# Patient Record
Sex: MALE | Race: WHITE | Employment: FULL TIME | URBAN - METROPOLITAN AREA
[De-identification: names, ages, dates, MRNs, and addresses within clinical notes are randomized per-mention and may not be internally consistent; named-entity substitution may affect disease eponyms.]

---

## 2017-10-16 ENCOUNTER — OFFICE VISIT (OUTPATIENT)
Dept: FAMILY MEDICINE | Facility: CLINIC | Age: 32
End: 2017-10-16
Payer: COMMERCIAL

## 2017-10-16 VITALS
DIASTOLIC BLOOD PRESSURE: 62 MMHG | WEIGHT: 150 LBS | SYSTOLIC BLOOD PRESSURE: 136 MMHG | HEIGHT: 69 IN | BODY MASS INDEX: 22.22 KG/M2 | TEMPERATURE: 99.4 F | HEART RATE: 62 BPM | RESPIRATION RATE: 14 BRPM | OXYGEN SATURATION: 97 %

## 2017-10-16 DIAGNOSIS — Z00.00 ROUTINE GENERAL MEDICAL EXAMINATION AT A HEALTH CARE FACILITY: Primary | ICD-10-CM

## 2017-10-16 PROCEDURE — 80053 COMPREHEN METABOLIC PANEL: CPT | Performed by: FAMILY MEDICINE

## 2017-10-16 PROCEDURE — 84443 ASSAY THYROID STIM HORMONE: CPT | Performed by: FAMILY MEDICINE

## 2017-10-16 PROCEDURE — 90715 TDAP VACCINE 7 YRS/> IM: CPT | Performed by: FAMILY MEDICINE

## 2017-10-16 PROCEDURE — 99395 PREV VISIT EST AGE 18-39: CPT | Mod: 25 | Performed by: FAMILY MEDICINE

## 2017-10-16 PROCEDURE — 80061 LIPID PANEL: CPT | Performed by: FAMILY MEDICINE

## 2017-10-16 PROCEDURE — 36415 COLL VENOUS BLD VENIPUNCTURE: CPT | Performed by: FAMILY MEDICINE

## 2017-10-16 PROCEDURE — 90471 IMMUNIZATION ADMIN: CPT | Performed by: FAMILY MEDICINE

## 2017-10-16 NOTE — LETTER
October 17, 2017      Sampson Sal  [address]   [facility] [address]        Dear ,    We are writing to inform you of your test results.    Your test results fall within the expected range(s) or remain unchanged from previous results.  Please continue with current treatment plan.    Resulted Orders   Comprehensive metabolic panel   Result Value Ref Range    Sodium 137 133 - 144 mmol/L    Potassium 3.8 3.4 - 5.3 mmol/L    Chloride 101 94 - 109 mmol/L    Carbon Dioxide 24 20 - 32 mmol/L    Anion Gap 12 3 - 14 mmol/L    Glucose 79 70 - 99 mg/dL      Comment:      Non Fasting    Urea Nitrogen 13 7 - 30 mg/dL    Creatinine 0.84 0.66 - 1.25 mg/dL    GFR Estimate >90 >60 mL/min/1.7m2      Comment:      Non  GFR Calc    GFR Estimate If Black >90 >60 mL/min/1.7m2      Comment:       GFR Calc    Calcium 9.5 8.5 - 10.1 mg/dL    Bilirubin Total 0.8 0.2 - 1.3 mg/dL    Albumin 4.5 3.4 - 5.0 g/dL    Protein Total 7.4 6.8 - 8.8 g/dL    Alkaline Phosphatase 66 40 - 150 U/L    ALT 21 0 - 70 U/L    AST 21 0 - 45 U/L   Lipid Profile   Result Value Ref Range    Cholesterol 151 <200 mg/dL    Triglycerides 50 <150 mg/dL      Comment:      Non Fasting    HDL Cholesterol 81 >39 mg/dL    LDL Cholesterol Calculated 60 <100 mg/dL      Comment:      Desirable:       <100 mg/dl    Non HDL Cholesterol 70 <130 mg/dL   TSH with free T4 reflex   Result Value Ref Range    TSH 1.43 0.40 - 4.00 mU/L       If you have any questions or concerns, please call the clinic at the number listed above.       Sincerely,        Markell Wick MD

## 2017-10-16 NOTE — MR AVS SNAPSHOT
After Visit Summary   10/16/2017    Sampson Sal    MRN: [id number]           Patient Information     Date Of Birth          1985        Visit Information        Provider Department      10/16/2017 2:00 PM Markell Wick MD [facility]        Today's Diagnoses     Routine general medical examination at a health care facility    -  1      Care Instructions      Preventive Health Recommendations  Male Ages 26 - 39    Yearly exam:             See your health care provider every year in order to  o   Review health changes.   o   Discuss preventive care.    o   Review your medicines if your doctor has prescribed any.    You should be tested each year for STDs (sexually transmitted diseases), if you re at risk.     After age 35, talk to your provider about cholesterol testing. If you are at risk for heart disease, have your cholesterol tested at least every 5 years.     If you are at risk for diabetes, you should have a diabetes test (fasting glucose).  Shots: Get a flu shot each year. Get a tetanus shot every 10 years.     Nutrition:    Eat at least 5 servings of fruits and vegetables daily.     Eat whole-grain bread, whole-wheat pasta and brown rice instead of white grains and rice.     Talk to your provider about Calcium and Vitamin D.     Lifestyle    Exercise for at least 150 minutes a week (30 minutes a day, 5 days a week). This will help you control your weight and prevent disease.     Limit alcohol to one drink per day.     No smoking.     Wear sunscreen to prevent skin cancer.     See your dentist every six months for an exam and cleaning.             Follow-ups after your visit        Who to contact     If you have questions or need follow up information about today's clinic visit or your schedule please contact [facility] directly at [phone].  Normal or non-critical lab and imaging results will be communicated to you by MyChart, letter or phone  "within 4 business days after the clinic has received the results. If you do not hear from us within 7 days, please contact the clinic through [employer] or phone. If you have a critical or abnormal lab result, we will notify you by phone as soon as possible.  Submit refill requests through [employer] or call your pharmacy and they will forward the refill request to us. Please allow 3 business days for your refill to be completed.          Additional Information About Your Visit        [employer] Information     [employer] lets you send messages to your doctor, view your test results, renew your prescriptions, schedule appointments and more. To sign up, go to [email or web address] . Click on \"Log in\" on the left side of the screen, which will take you to the Welcome page. Then click on \"Sign up Now\" on the right side of the page.     You will be asked to enter the access code listed below, as well as some personal information. Please follow the directions to create your username and password.     Your access code is: [email or web address]  Expires: 2018  3:08 PM     Your access code will  in 90 days. If you need help or a new code, please call your [place] clinic or [phone].        Care EveryWhere ID     This is your Care EveryWhere ID. This could be used by other organizations to access your [place] medical records  [id number]        Your Vitals Were     Pulse Temperature Respirations Height Pulse Oximetry BMI (Body Mass Index)    62 99.4  F (37.4  C) (Oral) 14 5' 9.2\" (1.758 m) 97% 22.02 kg/m2       Blood Pressure from Last 3 Encounters:   10/16/17 136/62   16 110/74   02/10/15 129/78    Weight from Last 3 Encounters:   10/16/17 150 lb (68 kg)   16 154 lb 14.4 oz (70.3 kg)   02/10/15 159 lb 3.2 oz (72.2 kg)              We Performed the Following     ADMIN 1st VACCINE     Comprehensive metabolic panel     Lipid Profile     SCREENING QUESTIONS FOR ADULT IMMUNIZATIONS     TDAP VACCINE (ADACEL)     TSH " with free T4 reflex        Primary Care Provider Office Phone # Fax #    Adonis Horvath [phone]       [address] [facility] [address]  [facility] [address]        Equal Access to Services     MICHELLE LAGUNA : Hadii aad ku hadberlino Soomaali, waaxda luqadaha, qaybta kaalmada adeegyada, waxjosr torresn curly garcia laZulycourtney bruner. So [facility] [phone].    ATENCIÓN: Si habla español, tiene a kirkpatrick disposición servicios gratuitos de asistencia lingüística. Llame al [phone].    We comply with applicable federal civil rights laws and [place] laws. We do not discriminate on the basis of race, color, national origin, age, disability, sex, sexual orientation, or gender identity.            Thank you!     Thank you for choosing [facility]  for your care. Our goal is always to provide you with excellent care. Hearing back from our patients is one way we can continue to improve our services. Please take a few minutes to complete the written survey that you may receive in the mail after your visit with us. Thank you!             Your Updated Medication List - Protect others around you: Learn how to safely use, store and throw away your medicines at www.disposemymeds.org.          This list is accurate as of: 10/16/17  3:08 PM.  Always use your most recent med list.                   Brand Name Dispense Instructions for use Diagnosis    DIFFERIN 0.1 % gel   Generic drug:  adapalene      Apply 0.5 inches topically daily (with breakfast). Apply to face        DUAC 1.2-5 % Gel   Generic drug:  clindamycin-benzoyl Per (Refr)      Externally apply  topically.

## 2017-10-16 NOTE — NURSING NOTE
"Chief Complaint   Patient presents with     Physical       Initial /62 (BP Location: Left arm)  Pulse 62  Temp 99.4  F (37.4  C) (Oral)  Resp 14  Ht 5' 9.2\" (1.758 m)  Wt 150 lb (68 kg)  SpO2 97%  BMI 22.02 kg/m2 Estimated body mass index is 22.02 kg/(m^2) as calculated from the following:    Height as of this encounter: 5' 9.2\" (1.758 m).    Weight as of this encounter: 150 lb (68 kg).  Medication Reconciliation: complete     Jocelyne Sears CMA      "

## 2017-10-16 NOTE — PROGRESS NOTES
SUBJECTIVE:   CC: Sampson Sal is an 32 year old male who presents for preventative health visit.     Healthy Habits:    Do you get at least three servings of calcium containing foods daily (dairy, green leafy vegetables, etc.)? yes    Amount of exercise or daily activities, outside of work: 3 day(s) per week    Problems taking medications regularly No    Medication side effects: No    Have you had an eye exam in the past two years? yes    Do you see a dentist twice per year? yes    Do you have sleep apnea, excessive snoring or daytime drowsiness?no            Occasional IBS symptoms , usually with alcohol or caffeine use, trying to cut back  Today's PHQ-2 Score:   PHQ-2 ( 1999 [employer]) 10/16/2017 6/24/2016   Q1: Little interest or pleasure in doing things 0 0   Q2: Feeling down, depressed or hopeless 0 0   PHQ-2 Score 0 0         Abuse: Current or Past(Physical, Sexual or Emotional)- No  Do you feel safe in your environment - Yes  Social History   Substance Use Topics     Smoking status: Never Smoker     Smokeless tobacco: Not on file     Alcohol use Yes     The patient does not drink >3 drinks per day nor >7 drinks per week.    Last PSA: No results found for: PSA    Reviewed orders with patient. Reviewed health maintenance and updated orders accordingly - Yes  Labs reviewed in EPIC    Reviewed and updated as needed this visit by clinical staff  Tobacco  Allergies  Meds         Reviewed and updated as needed this visit by Provider        Past Medical History:   Diagnosis Date     NO ACTIVE PROBLEMS         ROS:  C: NEGATIVE for fever, chills, change in weight  I: NEGATIVE for worrisome rashes, moles or lesions  E: NEGATIVE for vision changes or irritation  ENT: NEGATIVE for ear, mouth and throat problems  R: NEGATIVE for significant cough or SOB  CV: NEGATIVE for chest pain, palpitations or peripheral edema  GI: NEGATIVE for nausea, abdominal pain, heartburn, or change in bowel habits   male: negative for  "dysuria, hematuria, decreased urinary stream, erectile dysfunction, urethral discharge  M: NEGATIVE for significant arthralgias or myalgia  N: NEGATIVE for weakness, dizziness or paresthesias  P: NEGATIVE for changes in mood or affect    OBJECTIVE:   /62 (BP Location: Left arm)  Pulse 62  Temp 99.4  F (37.4  C) (Oral)  Resp 14  Ht 5' 9.2\" (1.758 m)  Wt 150 lb (68 kg)  SpO2 97%  BMI 22.02 kg/m2  EXAM:  GENERAL: healthy, alert and no distress  EYES: Eyes grossly normal to inspection, PERRL and conjunctivae and sclerae normal  HENT: ear canals and TM's normal, nose and mouth without ulcers or lesions  NECK: no adenopathy, no asymmetry, masses, or scars and thyroid normal to palpation  RESP: lungs clear to auscultation - no rales, rhonchi or wheezes  CV: regular rate and rhythm, normal S1 S2, no S3 or S4, no murmur, click or rub, no peripheral edema and peripheral pulses strong  ABDOMEN: soft, nontender, no hepatosplenomegaly, no masses and bowel sounds normal   (male): normal male genitalia without lesions or urethral discharge, no hernia  MS: no gross musculoskeletal defects noted, no edema  SKIN: no suspicious lesions or rashes  NEURO: Normal strength and tone, mentation intact and speech normal  PSYCH: mentation appears normal, affect normal/bright    ASSESSMENT/PLAN:   1. Routine general medical examination at a health care facility  In [facility]  - TDAP VACCINE (ADACEL)  - ADMIN 1st VACCINE  - Comprehensive metabolic panel  - Lipid Profile  - TSH with free T4 reflex    COUNSELING:  Reviewed preventive health counseling, as reflected in patient instructions       Regular exercise       Healthy diet/nutrition       Vision screening       Hearing screening       Immunizations    Vaccinated for: TDAP           Alcohol Use       Safe sex practices/STD prevention             reports that he has never smoked. He does not have any smokeless tobacco history on file.      Estimated body mass index is 22.02 " "kg/(m^2) as calculated from the following:    Height as of this encounter: 5' 9.2\" (1.758 m).    Weight as of this encounter: 150 lb (68 kg).         Counseling Resources:  ATP IV Guidelines  Pooled Cohorts Equation Calculator  FRAX Risk Assessment  ICSI Preventive Guidelines  Dietary Guidelines for Americans, 2010  USDA's MyPlate  ASA Prophylaxis  Lung CA Screening    Markell Wick MD  [facility]  "

## 2017-10-17 LAB
ALBUMIN SERPL-MCNC: 4.5 G/DL (ref 3.4–5)
ALP SERPL-CCNC: 66 U/L (ref 40–150)
ALT SERPL W P-5'-P-CCNC: 21 U/L (ref 0–70)
ANION GAP SERPL CALCULATED.3IONS-SCNC: 12 MMOL/L (ref 3–14)
AST SERPL W P-5'-P-CCNC: 21 U/L (ref 0–45)
BILIRUB SERPL-MCNC: 0.8 MG/DL (ref 0.2–1.3)
BUN SERPL-MCNC: 13 MG/DL (ref 7–30)
CALCIUM SERPL-MCNC: 9.5 MG/DL (ref 8.5–10.1)
CHLORIDE SERPL-SCNC: 101 MMOL/L (ref 94–109)
CHOLEST SERPL-MCNC: 151 MG/DL
CO2 SERPL-SCNC: 24 MMOL/L (ref 20–32)
CREAT SERPL-MCNC: 0.84 MG/DL (ref 0.66–1.25)
GFR SERPL CREATININE-BSD FRML MDRD: >90 ML/MIN/1.7M2
GLUCOSE SERPL-MCNC: 79 MG/DL (ref 70–99)
HDLC SERPL-MCNC: 81 MG/DL
LDLC SERPL CALC-MCNC: 60 MG/DL
NONHDLC SERPL-MCNC: 70 MG/DL
POTASSIUM SERPL-SCNC: 3.8 MMOL/L (ref 3.4–5.3)
PROT SERPL-MCNC: 7.4 G/DL (ref 6.8–8.8)
SODIUM SERPL-SCNC: 137 MMOL/L (ref 133–144)
TRIGL SERPL-MCNC: 50 MG/DL
TSH SERPL DL<=0.005 MIU/L-ACNC: 1.43 MU/L (ref 0.4–4)

## 2018-05-08 ENCOUNTER — TRANSFERRED RECORDS (OUTPATIENT)
Dept: HEALTH INFORMATION MANAGEMENT | Facility: CLINIC | Age: 33
End: 2018-05-08

## 2018-07-09 ENCOUNTER — TRANSFERRED RECORDS (OUTPATIENT)
Dept: HEALTH INFORMATION MANAGEMENT | Facility: CLINIC | Age: 33
End: 2018-07-09

## 2018-08-20 ENCOUNTER — TRANSFERRED RECORDS (OUTPATIENT)
Dept: HEALTH INFORMATION MANAGEMENT | Facility: CLINIC | Age: 33
End: 2018-08-20

## 2019-08-19 ENCOUNTER — OFFICE VISIT (OUTPATIENT)
Dept: FAMILY MEDICINE | Facility: CLINIC | Age: 34
End: 2019-08-19
Payer: COMMERCIAL

## 2019-08-19 ENCOUNTER — NURSE TRIAGE (OUTPATIENT)
Dept: NURSING | Facility: CLINIC | Age: 34
End: 2019-08-19

## 2019-08-19 VITALS
HEART RATE: 67 BPM | DIASTOLIC BLOOD PRESSURE: 74 MMHG | OXYGEN SATURATION: 97 % | HEIGHT: 70 IN | WEIGHT: 146.2 LBS | BODY MASS INDEX: 20.93 KG/M2 | SYSTOLIC BLOOD PRESSURE: 116 MMHG | TEMPERATURE: 98.1 F

## 2019-08-19 DIAGNOSIS — F33.9 EPISODE OF RECURRENT MAJOR DEPRESSIVE DISORDER, UNSPECIFIED DEPRESSION EPISODE SEVERITY (H): ICD-10-CM

## 2019-08-19 DIAGNOSIS — G47.00 INSOMNIA, UNSPECIFIED TYPE: ICD-10-CM

## 2019-08-19 DIAGNOSIS — F41.1 GAD (GENERALIZED ANXIETY DISORDER): ICD-10-CM

## 2019-08-19 DIAGNOSIS — F52.0 LACK OF LIBIDO: Primary | ICD-10-CM

## 2019-08-19 DIAGNOSIS — R53.83 FATIGUE, UNSPECIFIED TYPE: ICD-10-CM

## 2019-08-19 LAB
BASOPHILS # BLD AUTO: 0.1 10E9/L (ref 0–0.2)
BASOPHILS NFR BLD AUTO: 1.5 %
DIFFERENTIAL METHOD BLD: NORMAL
EOSINOPHIL # BLD AUTO: 0.1 10E9/L (ref 0–0.7)
EOSINOPHIL NFR BLD AUTO: 2.3 %
ERYTHROCYTE [DISTWIDTH] IN BLOOD BY AUTOMATED COUNT: 13.6 % (ref 10–15)
HCT VFR BLD AUTO: 40.8 % (ref 40–53)
HGB BLD-MCNC: 14.1 G/DL (ref 13.3–17.7)
LYMPHOCYTES # BLD AUTO: 1.6 10E9/L (ref 0.8–5.3)
LYMPHOCYTES NFR BLD AUTO: 29.7 %
MCH RBC QN AUTO: 30.9 PG (ref 26.5–33)
MCHC RBC AUTO-ENTMCNC: 34.6 G/DL (ref 31.5–36.5)
MCV RBC AUTO: 89 FL (ref 78–100)
MONOCYTES # BLD AUTO: 0.7 10E9/L (ref 0–1.3)
MONOCYTES NFR BLD AUTO: 12.5 %
NEUTROPHILS # BLD AUTO: 2.9 10E9/L (ref 1.6–8.3)
NEUTROPHILS NFR BLD AUTO: 54 %
PLATELET # BLD AUTO: 269 10E9/L (ref 150–450)
RBC # BLD AUTO: 4.57 10E12/L (ref 4.4–5.9)
WBC # BLD AUTO: 5.3 10E9/L (ref 4–11)

## 2019-08-19 PROCEDURE — 85025 COMPLETE CBC W/AUTO DIFF WBC: CPT | Performed by: NURSE PRACTITIONER

## 2019-08-19 PROCEDURE — 84443 ASSAY THYROID STIM HORMONE: CPT | Performed by: NURSE PRACTITIONER

## 2019-08-19 PROCEDURE — 80053 COMPREHEN METABOLIC PANEL: CPT | Performed by: NURSE PRACTITIONER

## 2019-08-19 PROCEDURE — 99214 OFFICE O/P EST MOD 30 MIN: CPT | Performed by: NURSE PRACTITIONER

## 2019-08-19 PROCEDURE — 36415 COLL VENOUS BLD VENIPUNCTURE: CPT | Performed by: NURSE PRACTITIONER

## 2019-08-19 RX ORDER — HYDROXYZINE PAMOATE 25 MG/1
25 CAPSULE ORAL 3 TIMES DAILY PRN
Qty: 90 CAPSULE | Refills: 1 | Status: SHIPPED | OUTPATIENT
Start: 2019-08-19 | End: 2021-04-01

## 2019-08-19 ASSESSMENT — ANXIETY QUESTIONNAIRES
7. FEELING AFRAID AS IF SOMETHING AWFUL MIGHT HAPPEN: SEVERAL DAYS
6. BECOMING EASILY ANNOYED OR IRRITABLE: SEVERAL DAYS
3. WORRYING TOO MUCH ABOUT DIFFERENT THINGS: MORE THAN HALF THE DAYS
1. FEELING NERVOUS, ANXIOUS, OR ON EDGE: MORE THAN HALF THE DAYS
2. NOT BEING ABLE TO STOP OR CONTROL WORRYING: MORE THAN HALF THE DAYS
GAD7 TOTAL SCORE: 12
IF YOU CHECKED OFF ANY PROBLEMS ON THIS QUESTIONNAIRE, HOW DIFFICULT HAVE THESE PROBLEMS MADE IT FOR YOU TO DO YOUR WORK, TAKE CARE OF THINGS AT HOME, OR GET ALONG WITH OTHER PEOPLE: SOMEWHAT DIFFICULT
5. BEING SO RESTLESS THAT IT IS HARD TO SIT STILL: MORE THAN HALF THE DAYS

## 2019-08-19 ASSESSMENT — PATIENT HEALTH QUESTIONNAIRE - PHQ9
SUM OF ALL RESPONSES TO PHQ QUESTIONS 1-9: 13
5. POOR APPETITE OR OVEREATING: MORE THAN HALF THE DAYS

## 2019-08-19 ASSESSMENT — MIFFLIN-ST. JEOR: SCORE: 1605.66

## 2019-08-19 NOTE — TELEPHONE ENCOUNTER
"Symptoms starting with \"low libido\"  type symptom that he attributed to stress. Reporting during \"last few days unusual symptoms.\" Decreased appetite, feeling jittery. \"I feel like I have anxiety.\" Difficulty sleeping \"I get 3 hours of sleep.\" Reporting difficulty sleeping in past few days.  \"I am in a weird stressful part of my life.\" Patient reporting spouse is pregnant. Stating his job is stressful. Patient is requesting to be seen today. Nausea.     Caller verbalized understanding. Denies further questions.    Meena Young RN  [facility]         Reason for Disposition    [1] Symptoms of anxiety or panic AND [2] has not been evaluated for this by physician    Additional Information    Negative: Severe difficulty breathing (e.g., struggling for each breath, speaks in single words)    Negative: Bluish (or gray) lips or face now    Negative: Difficult to awaken or acting confused (e.g., disoriented, slurred speech)    Negative: Hysterical or combative behavior    Negative: Sounds like a life-threatening emergency to the triager    Negative: [1] Difficulty breathing AND [2] persists > 10 minutes AND [3] not relieved by reassurance provided by triager    Negative: [1] Lightheadedness or dizziness AND [2] persists > 10 minutes AND [3] not relieved by reassurance provided by triager    Negative: [1] Known or suspected alcohol or drug abuse AND [2] feeling very shaky (i.e., visible tremors of hands)    Negative: Patient sounds very sick or weak to the triager    Negative: Symptoms interfere with work or school    Negative: Requesting to talk to a counselor (e.g., mental health worker, psychiatrist)    Negative: Patient sounds very upset or troubled to the triager    Protocols used: ANXIETY AND PANIC ATTACK-A-AH      "

## 2019-08-19 NOTE — PROGRESS NOTES
"Subjective     Sampson Sal is a 33 year old male who presents to clinic today for the following health issues:    HPI   Mood changes      Duration: a couple weeks     Description (location/character/radiation): a lot of anxiety, some small panic attacks, low sex drive, has not slept well the past few nights, has not been hungry, a little fatigue and tired.     Intensity:  moderate, severe    Accompanying signs and symptoms: tired, all the above.     History (similar episodes/previous evaluation): None    Precipitating or alleviating factors: None    Therapies tried and outcome: None     Thinks has a hormonal imbalance   One of the symptoms is complete lack libido, not taking care of himself  Reports for the past year has been stressful and made bad decisions, drank more  Started taking a Herbal remedy - Kratom on and off for the past 1-2 years -opioid type prperties he took approx 2-3 evenings a week felt relaxed, thought was a better alternative to alcohol  embarassed and has a hard time admitting this, very hesitant historian  Job \"weird,\" works in a lab , different management etc  Wife is expecting due in Oct first child  Thinking about the future and state of the world freaks him out, stories on news cause panic  Wife is a good support talked to her about it for the first time, he doesn't want to burden her   Stopped the herbal 3 weeks ago  Never counseling   Admits feels panicked when I said might be a wait to get in to see Endocrinology     Thinks he might have a lump in his penis that might be new, adds at end of visit, does not want exam today even though he knows he should   Last health maintenance exam 2017      Patient Active Problem List   Diagnosis     CARDIOVASCULAR SCREENING; LDL GOAL LESS THAN 160     ANDRIA (generalized anxiety disorder)     Lack of libido     Episode of recurrent major depressive disorder, unspecified depression episode severity (H)     Past Surgical History:   Procedure Laterality " "Date     none         Social History     Tobacco Use     Smoking status: Never Smoker     Smokeless tobacco: Never Used   Substance Use Topics     Alcohol use: Yes     History reviewed. No pertinent family history.      Current Outpatient Medications   Medication Sig Dispense Refill     hydrOXYzine (VISTARIL) 25 MG capsule Take 1 capsule (25 mg) by mouth 3 times daily as needed for anxiety And 2-4 capsules ( mg) at night for sleep 90 capsule 1     adapalene (DIFFERIN) 0.1 % gel Apply 0.5 inches topically daily (with breakfast). Apply to face       Clindamycin-Benzoyl Per, Refr, (DUAC) 1-5 % GEL Externally apply  topically.       No Known Allergies  Recent Labs   Lab Test 10/16/17  1425 06/24/16  1617 02/10/15  1521   LDL 60  --   --    HDL 81  --   --    TRIG 50  --   --    ALT 21 20 20   CR 0.84 0.86 0.78   GFRESTIMATED >90 >90  Non  GFR Calc   >90  Non  GFR Calc     GFRESTBLACK >90 >90   GFR Calc   >90   GFR Calc     POTASSIUM 3.8 4.2 3.9   TSH 1.43 1.59  --       BP Readings from Last 3 Encounters:   08/19/19 116/74   10/16/17 136/62   06/24/16 110/74    Wt Readings from Last 3 Encounters:   08/19/19 66.3 kg (146 lb 3.2 oz)   10/16/17 68 kg (150 lb)   06/24/16 70.3 kg (154 lb 14.4 oz)                      Reviewed and updated as needed this visit by Provider         Review of Systems   ROS COMP: Constitutional, HEENT, cardiovascular, pulmonary, GI, , musculoskeletal, neuro, skin, endocrine and psych systems are negative, except as otherwise noted.      Objective    /74   Pulse 67   Temp 98.1  F (36.7  C) (Oral)   Ht 1.78 m (5' 10.08\")   Wt 66.3 kg (146 lb 3.2 oz)   SpO2 97%   BMI 20.93 kg/m    Body mass index is 20.93 kg/m .  Physical Exam   GENERAL: healthy, thin, alert and no distress  EYES: Eyes grossly normal to inspection, PERRL and conjunctivae and sclerae normal  RESP: Respiratory rate regular and breathing easily   CV: No " abnormal color and extremities warm   MS: no gross musculoskeletal defects noted, no edema  SKIN: no suspicious lesions or rashes  NEURO: Normal strength and tone, mentation intact and speech normal  PSYCH: MENTAL STATUS EXAM  Appearance: appropriate  Attitude: guarded  Behavior: stiff appearing, quiet and then once talking many questions  Eye Contact: 90%  Speech: normal  Orientation: oriented to person , place, time and situation  Mood: states his mood has been low, anxious   Affect: flat and very anxious vs angry frustrated, difficult time answering at times   Thought Process: difficult to assess, insight lacking seems clear for the most part     Diagnostic Test Results:  Labs reviewed in Epic  Results for orders placed or performed in visit on 08/19/19 (from the past 24 hour(s))   CBC with platelets differential   Result Value Ref Range    WBC 5.3 4.0 - 11.0 10e9/L    RBC Count 4.57 4.4 - 5.9 10e12/L    Hemoglobin 14.1 13.3 - 17.7 g/dL    Hematocrit 40.8 40.0 - 53.0 %    MCV 89 78 - 100 fl    MCH 30.9 26.5 - 33.0 pg    MCHC 34.6 31.5 - 36.5 g/dL    RDW 13.6 10.0 - 15.0 %    Platelet Count 269 150 - 450 10e9/L    % Neutrophils 54.0 %    % Lymphocytes 29.7 %    % Monocytes 12.5 %    % Eosinophils 2.3 %    % Basophils 1.5 %    Absolute Neutrophil 2.9 1.6 - 8.3 10e9/L    Absolute Lymphocytes 1.6 0.8 - 5.3 10e9/L    Absolute Monocytes 0.7 0.0 - 1.3 10e9/L    Absolute Eosinophils 0.1 0.0 - 0.7 10e9/L    Absolute Basophils 0.1 0.0 - 0.2 10e9/L    Diff Method Automated Method            Assessment & Plan       ICD-10-CM    1. Lack of libido F52.0 ENDOCRINOLOGY ADULT REFERRAL   2. ANDRIA (generalized anxiety disorder) F41.1 Comprehensive metabolic panel     TSH with free T4 reflex     CBC with platelets differential     MENTAL HEALTH REFERRAL  - Adult; Outpatient Treatment; Individual/Couples/Family/Group Therapy/Health Psychology; G: [facility] [phone]; We will contact you to schedule the  appointment or please call with any questions     hydrOXYzine (VISTARIL) 25 MG capsule   3. Episode of recurrent major depressive disorder, unspecified depression episode severity (H) F33.9 Comprehensive metabolic panel     TSH with free T4 reflex     CBC with platelets differential     MENTAL HEALTH REFERRAL  - Adult; Outpatient Treatment; Individual/Couples/Family/Group Therapy/Health Psychology; G: [facility] [phone]; We will contact you to schedule the appointment or please call with any questions   4. Insomnia, unspecified type G47.00 hydrOXYzine (VISTARIL) 25 MG capsule   5. Fatigue, unspecified type R53.83    pt here with severe anxiety I believe is worse than he first thought, he agrees to trial vistaril for sleep and anxiety, doesn't want to start anything daily. Strongly recommended counseling, he will consider, referred. Denies any SI. Next visit will revisit ssri I would recommend, pt hesitant to do medications, agreed for hydroxyzine  Gave info on insomnia, all above conditions I feel are related to his lack of libido. Pt insists he needs to see specialist for T testing etc., referred to have a consult visit to decide whether or not testing is warranted.   Fatigue and and pt reports eating poorly and minimal exercise likely contributory along with insomnia not controlled, anxiety as above. Pt requests testing, Will do lab work today to rule out anemia, thyroid issues, electrolyte imbalances and will follow up as indicated.      Discussed the pathophysiology of anxiety episodes and the various symptoms seen associated with anxiety episodes.  Discussed possible triggers including fatigue, depression, stress, and chemicals such as alcohol, caffeine and certain drugs.  Discussed the treatment including an aerobic exercise program, adequate rest, and both rescue meds and maintenance meds.      Pt difficult historian due to state he was in, needed to help him with deep breathing to  work through panic, pt with difficult time giving feedback or answering questions if he agreed with plan. He did seem to agree with plan in patient instructions     Patient Instructions       Patient Education   Thanks for coming in, your top priority is working on your anxiety that does seem quite severe today. The first step is coming in to discuss it, so you are on the right path. Please schedule the soonest available appointment with [facility] so they can do further assessment and therapies that is the most helpful for sleep problems and adjustment to stressors as well.   Take hydroxyzine (low dose for anxiety and higher dose for sleep). Return for your health maintenance exam and we'll do the physical at that time.   Schedule with Endocrinology to get more info about your libido, although it would be good to try treating anxiety and depression since this is the most common cause more than something physically wrong with you.   Anxiety Reaction  Anxiety is the feeling we all get when we think something bad might happen. It is a normal response to stress and usually causes only a mild reaction. When anxiety becomes more severe, it can interfere with daily life. In some cases, you may not even be aware of what it is you re anxious about. There may also be a genetic link or it may be a learned behavior in the home.  Both psychological and physical triggers cause stress reaction. It's often a response to fear or emotional stress, real or imagined. This stress may come from home, family, work, or social relationships.  During an anxiety reaction, you may feel:    Helpless    Nervous    Depressed    Irritable  Your body may show signs of anxiety in many ways. You may experience:    Dry mouth    Shakiness    Dizziness    Weakness    Trouble breathing    Breathing fast (hyperventilating)    Chest pressure    Sweating    Headache    Nausea    Diarrhea    Tiredness    Inability to sleep    Sexual  problems  Home care    Try to locate the sources of stress in your life. They may not be obvious. These may include:  ? Daily hassles of life (such as traffic jams, missed appointments, or car troubles)  ? Major life changes, both good (new baby or job promotion) and bad (loss of job or loss of loved one)  ? Overload: feeling that you have too many responsibilities and can't take care of all of them at once  ? Feeling helpless or feeling that your problems are beyond what you re able to solve    Notice how your body reacts to stress. Learn to listen to your body signals. This will help you take action before the stress becomes severe.    When you can, do something about the source of your stress. (Avoid hassles, limit the amount of change that happens in your life at one time and take a break when you feel overloaded).    Unfortunately, many stressful situations can't be avoided. It is necessary to learn how to better manage stress. There are many proven methods that will reduce your anxiety. These include simple things like exercise, good nutrition, and adequate rest. Also, there are certain techniques that are helpful:  ? Relaxation  ? Breathing exercises  ? Visualization  ? Biofeedback  ? Meditation  For more information about this, consult your healthcare provider or go to a local bookstore and review the many books and tapes available on this subject.  Follow-up care  If you feel that your anxiety is not responding to self-help measures, contact your healthcare provider or make an appointment with a counselor. You may need short-term psychological counseling and temporary medicine to help you manage stress.  Call 911  Call 911 if any of these happen:    Trouble breathing    Confusion    Drowsiness or trouble wakening    Fainting or loss of consciousness    Rapid heart rate    Seizure    New chest pain that becomes more severe, lasts longer, or spreads into your shoulder, arm, neck, jaw, or back  When to seek  medical advice  Call your healthcare provider right away if any of these happen:    Your symptoms get worse    Severe headache not relieved by rest and mild pain reliever  Date Last Reviewed: 10/1/2017    7993-7542 [employer]. [address] [place] [address], [place] [address]. All rights reserved. This information is not intended as a substitute for professional medical care. Always follow your healthcare professional's instructions.           Patient Education     Insomnia  Insomnia is repeated difficulty going to sleep or staying asleep, or both. Whether you have insomnia is not defined by a specific amount of sleep. Different people need different amounts of sleep, and you may need more or less sleep at different times of your life.  There are 3 major types of insomnia: short-term, chronic, and  other.  Short-term, or acute insomnia lasts less than 3 months. The symptoms are temporary and can be linked directly to a stressor, such as the death of a loved one, financial problems, or a new physical problem. Short-term insomnia stops when the stressor resolves or the person adapts to its presence.  Chronic insomnia occurs at least 3 times a week and lasts longer than 3 months. Chronic insomnia can occur when either the cause of the sleeping problem is not clear, or the insomnia does not get better when the stressor is resolved. A number of other criteria are also used to make the diagnosis of chronic insomnia.    Other insomnia  is the third type of insomnia-related sleep disorders. This description applies to people who have problems getting to sleep or staying asleep, but don't meet all of the factors that describe either short-term or chronic insomnia.    Many things cause insomnia. Different people may have different causes. It can be from an underlying medical or psychological condition, or lifestyle. It can also be primary insomnia, which means no cause can be found.  Causes of insomnia  include:    Chronic medical problems- heart disease, gastrointestinal problems, hormonal changes, breathing problems    Anxiety    Stress    Depression    Pain    Work schedule    Sleep apnea    Illegal drugs    Certain medicines  Many different medicines can affect your sleep, such as stimulants, caffeine, alcohol, some decongestants, and diet pills. Other medicines may include some types of blood pressure pills, steroids, asthma medicines, antihistamines, antidepressants, seizure medicines and statins. Not all of these will affect your sleep, and they shouldn t be stopped without talking to your doctor.  Symptoms of insomnia can include:    Lying awake for long periods at night before falling asleep    Waking up several times during the night    Waking up early in the morning and not being able to get back to sleep    Feeling tired and not refreshed by sleep    Not being able to function properly during the day and finding it hard to concentrate    Irritability    Tiredness and fatigue during the day  Home care    Review your medicines with your doctor or pharmacist to find out if they can cause insomnia. Not all medicines will affect your sleep, but they shouldn't be stopped without reviewing them with your doctor. There may be serious side effects and consequences from suddenly stopping your medicines. Not taking them may cause strokes, heart attacks, and many other problems.    Caffeine, smoking, and alcohol also affect sleep. Limit your daily use and don't use these before bedtime. Alcohol may make you sleepy at first, but as its effects wear off, you may awaken a few hours later and have trouble returning to sleep.    Don't exercise, eat or drink large amounts of liquid within 2 hours of your bedtime.    Improve your sleep habits. Have a fixed bed and wake-up time. Try to keep noise, light and heat in your bedroom at a comfortable level. Try using earplugs or eyeshades if needed.     Don't watch TV in  bed.    If you don't fall asleep within 30 minutes, try to relax by reading or listening to soft music.    Limit daytime napping to one 30 minute period, early in the day.    Get regular exercise. Find other ways to lessen your stress level.    If a medicine was prescribed to help reset your sleep patterns, take it as directed. Sleeping pills are intended for short-term use, only. If taken for too long, the effect wears off while the risk of physical addiction and psychological dependence increases.  Sleep diary  If the cause isn t obvious and it is not improving, try keeping a  sleep diary  for a couple of weeks. Include in it:    The time you go to bed    How long it takes to fall asleep    How many times you wake up    What time you wake up    Your meal times and what you eat    What time you drink alcohol and caffeine    Your exercise habits and times  Follow-up care  Follow up with your healthcare provider, or as advised. If an X-ray or CT scan was done, you will be notified if there is a change in the reading, especially if it affects treatment.  Call 911  Call 911 if any of these occur:    Trouble breathing    Confusion or trouble waking    Fainting or loss of consciousness    Rapid heart rate    New chest, arm, shoulder, neck or upper back pain    Trouble with speech or vision, weakness of an arm or leg    Trouble walking or talking, loss of balance, numbness or weakness in one side of your body, facial droop  When to seek medical advice  Call your healthcare provider right away if any of these occur:    Extreme restlessness or irritability    Confusion or hallucinations (seeing or hearing things that are not there)    Anxiety, depression    Several days without sleeping  Date Last Reviewed: 5/1/2018 2000-2018 [employer]. [address] [place] [id number]. All rights reserved. This information is not intended as a substitute for professional medical care. Always follow your  healthcare professional's instructions.           Patient Education     Treating Anxiety Disorders with Therapy    If you have an anxiety disorder, you don t have to suffer anymore. Treatment is available. Therapy (also called counseling) is often a helpful treatment for anxiety disorders. With therapy, a specially trained professional (therapist) helps you face and learn to manage your anxiety. Therapy can be short-term or long-term depending on your needs. In some cases, medicine may also be prescribed with therapy. It may take time before you notice how much therapy is helping, but stick with it. With therapy, you can feel better.  Cognitive behavioral therapy (CBT)  Cognitive behavioral therapy (CBT) teaches you to manage anxiety. It does this by helping you understand how you think and act when you re anxious. Research has shown CBT to be a very effective treatment for anxiety disorders. How CBT is run is almost like a class. It involves homework and activities to build skills that teach you to cope with anxiety step by step. It can be done in a group or one-on-one, and often takes place for a set number of sessions. CBT has two main parts:    Cognitive therapy helps you identify the negative, irrational thoughts that occur with your anxiety. You ll learn to replace these with more positive, realistic thoughts.    Behavioral therapy helps you change how you react to anxiety. You ll learn coping skills and methods for relaxing to help you better deal with anxiety.  Other forms of therapy  Other therapy methods may work better for you than CBT. Or, you may move from CBT to another form of therapy as your treatment needs change. This may mean meeting with a therapist by yourself or in a group. Therapy can also help you work through problems in your life, such as drug or alcohol dependence, that may be making your anxiety worse.  Getting better takes time  Therapy will help you feel better and teach you skills to  help manage anxiety long term. But change doesn t happen right away. It takes a commitment from you. And treatment only works if you learn to face the causes of your anxiety. So, you might feel worse before you feel better. This can sometimes make it hard to stick with it. But remember: Therapy is a very effective treatment. The results will be well worth it.  Helping yourself  If anxiety is wearing you down, here are some things you can do to cope:    Check with your doctor and rule out any physical problems that may be causing the anxiety symptoms.    If an anxiety disorder is diagnosed, seek mental healthcare. This is an illness and it can respond to treatment. Most types of anxiety disorders will respond to talk therapy and medicine.    Educate yourself about anxiety disorders. Keep track of helpful online resources and books you can use during stressful periods.    Try stress management techniques such as meditation.    Consider online or in-person support groups.    Don t fight your feelings. Anxiety feeds itself. The more you worry about it, the worse it gets. Instead, try to identify what might have triggered your anxiety. Then try to put this threat in perspective.    Keep in mind that you can t control everything about a situation. Change what you can and let the rest take its course.    Exercise -- it s a great way to relieve tension and help your body feel relaxed.    Examine your life for stress, and try to find ways to reduce it.    Avoid caffeine and nicotine, which can make anxiety symptoms worse.    Fight the temptation to turn to alcohol or unprescribed drugs for relief. They only make things worse in the long run.   Date Last Reviewed: 1/1/2017 2000-2018 [employer]. [address] [place] [address], [place] [address]. All rights reserved. This information is not intended as a substitute for professional medical care. Always follow your healthcare professional's instructions.            Patient Education     Depression  Depression is one of the most common mental health problems today. It is not just a state of unhappiness or sadness. It is a true disease. The cause seems to be related to a decrease in chemicals that transmit signals in the brain. Having a family history of depression, alcoholism, or suicide increases the risk. Chronic illness, chronic pain, migraine headaches, and high emotional stress also increase the risk.  Depression is something we tend to recognize in others, but may have a hard time seeing in ourselves. It can show in many physical and emotional ways:    Loss of appetite    Overeating    Not being able to sleep    Sleeping too much    Tiredness not related to physical exertion    Restlessness or irritability    Slowness of movement or speech    Feeling depressed or withdrawn    Loss of interest in things you once enjoyed    Trouble concentrating, poor memory, trouble making decisions    Thoughts of harming or killing oneself, or thoughts that life is not worth living    Low self-esteem  The treatment for depression may include both medicine and psychotherapy. Antidepressants can reduce suffering and can improve the ability to function during the depressed period. Therapy can offer emotional support and help you understand emotional factors that may be causing the depression.  Home care    Ongoing care and support help people manage this disease. Find a healthcare provider and therapist who meet your needs. Seek help when you feel like you may be getting ill.    Be kind to yourself. Make it a point to do things that you enjoy (gardening, walking in nature, going to a movie). Reward yourself for small successes.    Take care of your physical body. Eat a balanced diet (low in saturated fat and high in fruits and vegetables). Exercise at least 3 times a week for 30 minutes. Even mild-moderate exercise (like brisk walking) can make you feel better.    Don't drink alcohol,  which can make depression worse.    Take medicine as prescribed.    Tell each of your healthcare providers about all of the prescription and over-the-counter medicines, vitamins, and supplements you take. Certain supplements interact with medicines and can result in dangerous side effects. Ask your pharmacist when you have questions about medicine interactions.    Talk with your family and trusted friends about your feelings and thoughts. Ask them to help you recognize behavior changes early so you can get help and, if needed, medicine can be adjusted.  Follow-up care  Follow up with your healthcare provider, or as advised.  Call 911  Call 911 if you:    Have suicidal thoughts, a suicide plan, and the means to carry out the plan; or serious thoughts of hurting someone else     Have trouble breathing    Are very confused    Feel very drowsy or have trouble awakening    Faint or lose consciousness    Have new chest pain that becomes more severe, lasts longer, or spreads into your shoulder, arm, neck, jaw, or back  When to seek medical advice  Call your healthcare provider right away if any of these happen:    Feeling extreme depression, fear, anxiety, or anger toward yourself or others    Feeling out of control    Feeling that you may try to harm yourself or another    Hearing voices that others do not hear    Seeing things that others do not see    Can t sleep or eat for 3 days in a row    Friends or family express concern over your behavior and ask you to seek help  Date Last Reviewed: 10/1/2017    1458-5167 [employer]. [address] [place] [address]. All rights reserved. This information is not intended as a substitute for professional medical care. Always follow your healthcare professional's instructions.               Return in about 1 week (around 8/26/2019) for next annual exam or as needed.    I spent 35 min in direct face to face time with this pt, greater than 50% in counseling and  coordination of care of above diagnoses    YAQUELIN Cates CNP  [facility] [place]

## 2019-08-19 NOTE — PATIENT INSTRUCTIONS
Patient Education   Thanks for coming in, your top priority is working on your anxiety that does seem quite severe today. The first step is coming in to discuss it, so you are on the right path. Please schedule the soonest available appointment with [facility] so they can do further assessment and therapies that is the most helpful for sleep problems and adjustment to stressors as well.   Take hydroxyzine (low dose for anxiety and higher dose for sleep). Return for your health maintenance exam and we'll do the physical at that time.   Schedule with Endocrinology to get more info about your libido, although it would be good to try treating anxiety and depression since this is the most common cause more than something physically wrong with you.   Anxiety Reaction  Anxiety is the feeling we all get when we think something bad might happen. It is a normal response to stress and usually causes only a mild reaction. When anxiety becomes more severe, it can interfere with daily life. In some cases, you may not even be aware of what it is you re anxious about. There may also be a genetic link or it may be a learned behavior in the home.  Both psychological and physical triggers cause stress reaction. It's often a response to fear or emotional stress, real or imagined. This stress may come from home, family, work, or social relationships.  During an anxiety reaction, you may feel:    Helpless    Nervous    Depressed    Irritable  Your body may show signs of anxiety in many ways. You may experience:    Dry mouth    Shakiness    Dizziness    Weakness    Trouble breathing    Breathing fast (hyperventilating)    Chest pressure    Sweating    Headache    Nausea    Diarrhea    Tiredness    Inability to sleep    Sexual problems  Home care    Try to locate the sources of stress in your life. They may not be obvious. These may include:  ? Daily hassles of life (such as traffic jams, missed appointments, or car  troubles)  ? Major life changes, both good (new baby or job promotion) and bad (loss of job or loss of loved one)  ? Overload: feeling that you have too many responsibilities and can't take care of all of them at once  ? Feeling helpless or feeling that your problems are beyond what you re able to solve    Notice how your body reacts to stress. Learn to listen to your body signals. This will help you take action before the stress becomes severe.    When you can, do something about the source of your stress. (Avoid hassles, limit the amount of change that happens in your life at one time and take a break when you feel overloaded).    Unfortunately, many stressful situations can't be avoided. It is necessary to learn how to better manage stress. There are many proven methods that will reduce your anxiety. These include simple things like exercise, good nutrition, and adequate rest. Also, there are certain techniques that are helpful:  ? Relaxation  ? Breathing exercises  ? Visualization  ? Biofeedback  ? Meditation  For more information about this, consult your healthcare provider or go to a local bookstore and review the many books and tapes available on this subject.  Follow-up care  If you feel that your anxiety is not responding to self-help measures, contact your healthcare provider or make an appointment with a counselor. You may need short-term psychological counseling and temporary medicine to help you manage stress.  Call 911  Call 911 if any of these happen:    Trouble breathing    Confusion    Drowsiness or trouble wakening    Fainting or loss of consciousness    Rapid heart rate    Seizure    New chest pain that becomes more severe, lasts longer, or spreads into your shoulder, arm, neck, jaw, or back  When to seek medical advice  Call your healthcare provider right away if any of these happen:    Your symptoms get worse    Severe headache not relieved by rest and mild pain reliever  Date Last Reviewed:  10/1/2017    2861-8637 [employer]. [address] [place] [address], [place] [address]. All rights reserved. This information is not intended as a substitute for professional medical care. Always follow your healthcare professional's instructions.           Patient Education     Insomnia  Insomnia is repeated difficulty going to sleep or staying asleep, or both. Whether you have insomnia is not defined by a specific amount of sleep. Different people need different amounts of sleep, and you may need more or less sleep at different times of your life.  There are 3 major types of insomnia: short-term, chronic, and  other.  Short-term, or acute insomnia lasts less than 3 months. The symptoms are temporary and can be linked directly to a stressor, such as the death of a loved one, financial problems, or a new physical problem. Short-term insomnia stops when the stressor resolves or the person adapts to its presence.  Chronic insomnia occurs at least 3 times a week and lasts longer than 3 months. Chronic insomnia can occur when either the cause of the sleeping problem is not clear, or the insomnia does not get better when the stressor is resolved. A number of other criteria are also used to make the diagnosis of chronic insomnia.    Other insomnia  is the third type of insomnia-related sleep disorders. This description applies to people who have problems getting to sleep or staying asleep, but don't meet all of the factors that describe either short-term or chronic insomnia.    Many things cause insomnia. Different people may have different causes. It can be from an underlying medical or psychological condition, or lifestyle. It can also be primary insomnia, which means no cause can be found.  Causes of insomnia include:    Chronic medical problems- heart disease, gastrointestinal problems, hormonal changes, breathing problems    Anxiety    Stress    Depression    Pain    Work schedule    Sleep apnea    Illegal  drugs    Certain medicines  Many different medicines can affect your sleep, such as stimulants, caffeine, alcohol, some decongestants, and diet pills. Other medicines may include some types of blood pressure pills, steroids, asthma medicines, antihistamines, antidepressants, seizure medicines and statins. Not all of these will affect your sleep, and they shouldn t be stopped without talking to your doctor.  Symptoms of insomnia can include:    Lying awake for long periods at night before falling asleep    Waking up several times during the night    Waking up early in the morning and not being able to get back to sleep    Feeling tired and not refreshed by sleep    Not being able to function properly during the day and finding it hard to concentrate    Irritability    Tiredness and fatigue during the day  Home care    Review your medicines with your doctor or pharmacist to find out if they can cause insomnia. Not all medicines will affect your sleep, but they shouldn't be stopped without reviewing them with your doctor. There may be serious side effects and consequences from suddenly stopping your medicines. Not taking them may cause strokes, heart attacks, and many other problems.    Caffeine, smoking, and alcohol also affect sleep. Limit your daily use and don't use these before bedtime. Alcohol may make you sleepy at first, but as its effects wear off, you may awaken a few hours later and have trouble returning to sleep.    Don't exercise, eat or drink large amounts of liquid within 2 hours of your bedtime.    Improve your sleep habits. Have a fixed bed and wake-up time. Try to keep noise, light and heat in your bedroom at a comfortable level. Try using earplugs or eyeshades if needed.     Don't watch TV in bed.    If you don't fall asleep within 30 minutes, try to relax by reading or listening to soft music.    Limit daytime napping to one 30 minute period, early in the day.    Get regular exercise. Find other  ways to lessen your stress level.    If a medicine was prescribed to help reset your sleep patterns, take it as directed. Sleeping pills are intended for short-term use, only. If taken for too long, the effect wears off while the risk of physical addiction and psychological dependence increases.  Sleep diary  If the cause isn t obvious and it is not improving, try keeping a  sleep diary  for a couple of weeks. Include in it:    The time you go to bed    How long it takes to fall asleep    How many times you wake up    What time you wake up    Your meal times and what you eat    What time you drink alcohol and caffeine    Your exercise habits and times  Follow-up care  Follow up with your healthcare provider, or as advised. If an X-ray or CT scan was done, you will be notified if there is a change in the reading, especially if it affects treatment.  Call 911  Call 911 if any of these occur:    Trouble breathing    Confusion or trouble waking    Fainting or loss of consciousness    Rapid heart rate    New chest, arm, shoulder, neck or upper back pain    Trouble with speech or vision, weakness of an arm or leg    Trouble walking or talking, loss of balance, numbness or weakness in one side of your body, facial droop  When to seek medical advice  Call your healthcare provider right away if any of these occur:    Extreme restlessness or irritability    Confusion or hallucinations (seeing or hearing things that are not there)    Anxiety, depression    Several days without sleeping  Date Last Reviewed: 5/1/2018 2000-2018 [employer]. [address] [place] [address]. All rights reserved. This information is not intended as a substitute for professional medical care. Always follow your healthcare professional's instructions.           Patient Education     Treating Anxiety Disorders with Therapy    If you have an anxiety disorder, you don t have to suffer anymore. Treatment is available. Therapy  (also called counseling) is often a helpful treatment for anxiety disorders. With therapy, a specially trained professional (therapist) helps you face and learn to manage your anxiety. Therapy can be short-term or long-term depending on your needs. In some cases, medicine may also be prescribed with therapy. It may take time before you notice how much therapy is helping, but stick with it. With therapy, you can feel better.  Cognitive behavioral therapy (CBT)  Cognitive behavioral therapy (CBT) teaches you to manage anxiety. It does this by helping you understand how you think and act when you re anxious. Research has shown CBT to be a very effective treatment for anxiety disorders. How CBT is run is almost like a class. It involves homework and activities to build skills that teach you to cope with anxiety step by step. It can be done in a group or one-on-one, and often takes place for a set number of sessions. CBT has two main parts:    Cognitive therapy helps you identify the negative, irrational thoughts that occur with your anxiety. You ll learn to replace these with more positive, realistic thoughts.    Behavioral therapy helps you change how you react to anxiety. You ll learn coping skills and methods for relaxing to help you better deal with anxiety.  Other forms of therapy  Other therapy methods may work better for you than CBT. Or, you may move from CBT to another form of therapy as your treatment needs change. This may mean meeting with a therapist by yourself or in a group. Therapy can also help you work through problems in your life, such as drug or alcohol dependence, that may be making your anxiety worse.  Getting better takes time  Therapy will help you feel better and teach you skills to help manage anxiety long term. But change doesn t happen right away. It takes a commitment from you. And treatment only works if you learn to face the causes of your anxiety. So, you might feel worse before you feel  better. This can sometimes make it hard to stick with it. But remember: Therapy is a very effective treatment. The results will be well worth it.  Helping yourself  If anxiety is wearing you down, here are some things you can do to cope:    Check with your doctor and rule out any physical problems that may be causing the anxiety symptoms.    If an anxiety disorder is diagnosed, seek mental healthcare. This is an illness and it can respond to treatment. Most types of anxiety disorders will respond to talk therapy and medicine.    Educate yourself about anxiety disorders. Keep track of helpful online resources and books you can use during stressful periods.    Try stress management techniques such as meditation.    Consider online or in-person support groups.    Don t fight your feelings. Anxiety feeds itself. The more you worry about it, the worse it gets. Instead, try to identify what might have triggered your anxiety. Then try to put this threat in perspective.    Keep in mind that you can t control everything about a situation. Change what you can and let the rest take its course.    Exercise -- it s a great way to relieve tension and help your body feel relaxed.    Examine your life for stress, and try to find ways to reduce it.    Avoid caffeine and nicotine, which can make anxiety symptoms worse.    Fight the temptation to turn to alcohol or unprescribed drugs for relief. They only make things worse in the long run.   Date Last Reviewed: 1/1/2017 2000-2018 [employer]. [address] [place] [id number]. All rights reserved. This information is not intended as a substitute for professional medical care. Always follow your healthcare professional's instructions.           Patient Education     Depression  Depression is one of the most common mental health problems today. It is not just a state of unhappiness or sadness. It is a true disease. The cause seems to be related to a decrease in  chemicals that transmit signals in the brain. Having a family history of depression, alcoholism, or suicide increases the risk. Chronic illness, chronic pain, migraine headaches, and high emotional stress also increase the risk.  Depression is something we tend to recognize in others, but may have a hard time seeing in ourselves. It can show in many physical and emotional ways:    Loss of appetite    Overeating    Not being able to sleep    Sleeping too much    Tiredness not related to physical exertion    Restlessness or irritability    Slowness of movement or speech    Feeling depressed or withdrawn    Loss of interest in things you once enjoyed    Trouble concentrating, poor memory, trouble making decisions    Thoughts of harming or killing oneself, or thoughts that life is not worth living    Low self-esteem  The treatment for depression may include both medicine and psychotherapy. Antidepressants can reduce suffering and can improve the ability to function during the depressed period. Therapy can offer emotional support and help you understand emotional factors that may be causing the depression.  Home care    Ongoing care and support help people manage this disease. Find a healthcare provider and therapist who meet your needs. Seek help when you feel like you may be getting ill.    Be kind to yourself. Make it a point to do things that you enjoy (gardening, walking in nature, going to a movie). Reward yourself for small successes.    Take care of your physical body. Eat a balanced diet (low in saturated fat and high in fruits and vegetables). Exercise at least 3 times a week for 30 minutes. Even mild-moderate exercise (like brisk walking) can make you feel better.    Don't drink alcohol, which can make depression worse.    Take medicine as prescribed.    Tell each of your healthcare providers about all of the prescription and over-the-counter medicines, vitamins, and supplements you take. Certain supplements  interact with medicines and can result in dangerous side effects. Ask your pharmacist when you have questions about medicine interactions.    Talk with your family and trusted friends about your feelings and thoughts. Ask them to help you recognize behavior changes early so you can get help and, if needed, medicine can be adjusted.  Follow-up care  Follow up with your healthcare provider, or as advised.  Call 911  Call 911 if you:    Have suicidal thoughts, a suicide plan, and the means to carry out the plan; or serious thoughts of hurting someone else     Have trouble breathing    Are very confused    Feel very drowsy or have trouble awakening    Faint or lose consciousness    Have new chest pain that becomes more severe, lasts longer, or spreads into your shoulder, arm, neck, jaw, or back  When to seek medical advice  Call your healthcare provider right away if any of these happen:    Feeling extreme depression, fear, anxiety, or anger toward yourself or others    Feeling out of control    Feeling that you may try to harm yourself or another    Hearing voices that others do not hear    Seeing things that others do not see    Can t sleep or eat for 3 days in a row    Friends or family express concern over your behavior and ask you to seek help  Date Last Reviewed: 10/1/2017    [id number] [employer]. [address] [place] [address], [place] [address]. All rights reserved. This information is not intended as a substitute for professional medical care. Always follow your healthcare professional's instructions.

## 2019-08-20 ENCOUNTER — TELEPHONE (OUTPATIENT)
Dept: FAMILY MEDICINE | Facility: CLINIC | Age: 34
End: 2019-08-20

## 2019-08-20 LAB
ALBUMIN SERPL-MCNC: 4.6 G/DL (ref 3.4–5)
ALP SERPL-CCNC: 57 U/L (ref 40–150)
ALT SERPL W P-5'-P-CCNC: 20 U/L (ref 0–70)
ANION GAP SERPL CALCULATED.3IONS-SCNC: 7 MMOL/L (ref 3–14)
AST SERPL W P-5'-P-CCNC: 17 U/L (ref 0–45)
BILIRUB SERPL-MCNC: 0.6 MG/DL (ref 0.2–1.3)
BUN SERPL-MCNC: 8 MG/DL (ref 7–30)
CALCIUM SERPL-MCNC: 9.6 MG/DL (ref 8.5–10.1)
CHLORIDE SERPL-SCNC: 107 MMOL/L (ref 94–109)
CO2 SERPL-SCNC: 25 MMOL/L (ref 20–32)
CREAT SERPL-MCNC: 0.72 MG/DL (ref 0.66–1.25)
GFR SERPL CREATININE-BSD FRML MDRD: >90 ML/MIN/{1.73_M2}
GLUCOSE SERPL-MCNC: 90 MG/DL (ref 70–99)
POTASSIUM SERPL-SCNC: 4.2 MMOL/L (ref 3.4–5.3)
PROT SERPL-MCNC: 7.7 G/DL (ref 6.8–8.8)
SODIUM SERPL-SCNC: 139 MMOL/L (ref 133–144)
TSH SERPL DL<=0.005 MIU/L-ACNC: 1.38 MU/L (ref 0.4–4)

## 2019-08-20 ASSESSMENT — ANXIETY QUESTIONNAIRES: GAD7 TOTAL SCORE: 12

## 2019-08-20 NOTE — TELEPHONE ENCOUNTER
Eleanor,  Spoke with patient regarding his lab results. Patient is upset and is wondering why additional testing was not done in regards to his issues with Libido.     Patient states he was interested in getting Testerone and other hormone levels. Patient is not sure why these tests were not order.    Per patient, he felt like provider thought it was all mental health related, when he wanted additional testing to make sure there is not another underlying issue    Patient states when he tried to schedule with endocrinology, he was unable. Per endocrinology, PCP must order other testing for patient before he is able to see them     Patient is unhappy and seems like he feels displeased with the care he received.    Patient is requesting 1. additional testing with hormone workup or an explanation of why this testing cannot be done.    Patient states he would like to speak with you directly    Please advise    Thank You!  Kaylan Chisohlm, RN  Triage Nurse

## 2019-08-21 ENCOUNTER — OFFICE VISIT (OUTPATIENT)
Dept: FAMILY MEDICINE | Facility: CLINIC | Age: 34
End: 2019-08-21
Payer: COMMERCIAL

## 2019-08-21 ENCOUNTER — TELEPHONE (OUTPATIENT)
Dept: FAMILY MEDICINE | Facility: CLINIC | Age: 34
End: 2019-08-21

## 2019-08-21 VITALS
HEART RATE: 83 BPM | OXYGEN SATURATION: 97 % | WEIGHT: 146.8 LBS | DIASTOLIC BLOOD PRESSURE: 70 MMHG | BODY MASS INDEX: 21.02 KG/M2 | TEMPERATURE: 99.1 F | SYSTOLIC BLOOD PRESSURE: 108 MMHG | HEIGHT: 70 IN

## 2019-08-21 DIAGNOSIS — F41.1 GAD (GENERALIZED ANXIETY DISORDER): ICD-10-CM

## 2019-08-21 DIAGNOSIS — F33.9 EPISODE OF RECURRENT MAJOR DEPRESSIVE DISORDER, UNSPECIFIED DEPRESSION EPISODE SEVERITY (H): ICD-10-CM

## 2019-08-21 DIAGNOSIS — F52.0 LACK OF LIBIDO: ICD-10-CM

## 2019-08-21 DIAGNOSIS — N50.89 MASS OF LEFT TESTICLE: ICD-10-CM

## 2019-08-21 DIAGNOSIS — Z00.00 ROUTINE GENERAL MEDICAL EXAMINATION AT A HEALTH CARE FACILITY: Primary | ICD-10-CM

## 2019-08-21 PROCEDURE — 36415 COLL VENOUS BLD VENIPUNCTURE: CPT | Performed by: NURSE PRACTITIONER

## 2019-08-21 PROCEDURE — 99214 OFFICE O/P EST MOD 30 MIN: CPT | Mod: 25 | Performed by: NURSE PRACTITIONER

## 2019-08-21 PROCEDURE — 84403 ASSAY OF TOTAL TESTOSTERONE: CPT | Performed by: NURSE PRACTITIONER

## 2019-08-21 PROCEDURE — 99395 PREV VISIT EST AGE 18-39: CPT | Performed by: NURSE PRACTITIONER

## 2019-08-21 PROCEDURE — 84270 ASSAY OF SEX HORMONE GLOBUL: CPT | Performed by: NURSE PRACTITIONER

## 2019-08-21 ASSESSMENT — MIFFLIN-ST. JEOR: SCORE: 1607.13

## 2019-08-21 NOTE — PROGRESS NOTES
"    SUBJECTIVE:   CC: Sampson Sal is an 33 year old male who presents for preventive health visit.     Healthy Habits:    Do you get at least three servings of calcium containing foods daily (dairy, green leafy vegetables, etc.)? yes    Amount of exercise or daily activities, outside of work: 7 day(s) per week    Problems taking medications regularly No    Medication side effects: No    Have you had an eye exam in the past two years? no    Do you see a dentist twice per year? yes    Do you have sleep apnea, excessive snoring or daytime drowsiness?no    Concern - libido, testosterone testing  Onset on concern: about 2 days ago     Description of concerning symptoms:   A phone call from nurse that the range were normal. No concern of those tests. Would like the results. Had blood work done for liver function and other tests.      Intensity: mild    Progression of Symptoms:  same    Accompanying Signs & Symptoms:  none    Previous history of similar problem:   none    Precipitating factors:   Worsened by: none    Alleviating factors:  Improved by: none    Therapies Tried and outcome: none    Saw NP two days ago and felt the focus was all on anxiety.  He remains concerned about low libido as well as a bump on left testicle first noted over the weekend.  He is very worried that something dangerous is going on due to abrupt drop in libido and \"weird different mood patterns\" - specifically that his hormones are off or that he has something physically wrong in his brain.  Wants to know if he has unusual hormonal levels - testosterone.  Also worried that his low libido will cause marital discord.    Libido \"went away\" about a month ago.  Wife is pregnant and was on pelvic rest so timing is difficult to pin down, but it is recent and very different from his previous experience.  Had previously woken up feeling very aroused and this has disappeared.  Doesn't have powerful orgasms and has less semen than he used to.  He does " not have the urge to masturbate even when he tries.  No urination changes, blood in urine or semen.  No acute changes in muscle mass, but he does feel less definition in his abdomen.  Has always had GI trouble, but this is relatively ok right now with normal and regular stools.  No joint pains.  Exercises in the gym about once a week typically runs or does push ups.  Sleep has been disrupted since worry.  Weight is stable.    Discloses use of supplement Kratom when he felt he was drinking too much and wanted to reduce drinking.  He expresses shame and regret that he used this supplement and finds it difficult to talk about.  His drinking pattern had been 2-3 per week night and much more Friday and Saturday.  He is still drinking, but not this much currently.  When he used the kratom it was 2-3 days per week.    Around onset did have episode of up at night vomiting - had been on day when he had taken kratom  Used to regularly have GI problems, but is pretty ok right now    He feels his anxiety is more organic and/or secondary to health concerns and not the primary health problem.  He has had high enough anxiety to feel non-functional.  He has been taking hydroxyzine as prescribed and found it helpful, but wonders about long-term use.    Today's PHQ-2 Score:   PHQ-2 ( 1999 Pfizer) 8/19/2019 10/16/2017   Q1: Little interest or pleasure in doing things 1 0   Q2: Feeling down, depressed or hopeless 1 0   PHQ-2 Score 2 0       Abuse: Current or Past(Physical, Sexual or Emotional)- No  Do you feel safe in your environment? Yes    Social History     Tobacco Use     Smoking status: Never Smoker     Smokeless tobacco: Never Used   Substance Use Topics     Alcohol use: Yes     If you drink alcohol do you typically have >3 drinks per day or >7 drinks per week? No                      Last PSA: No results found for: PSA    Reviewed orders with patient. Reviewed health maintenance and updated orders accordingly - Yes  Lab work is  "in process  Labs reviewed in EPIC    Reviewed and updated as needed this visit by clinical staff  Tobacco         Reviewed and updated as needed this visit by Provider        Wt Readings from Last 5 Encounters:   08/21/19 66.6 kg (146 lb 12.8 oz)   08/19/19 66.3 kg (146 lb 3.2 oz)   10/16/17 68 kg (150 lb)   06/24/16 70.3 kg (154 lb 14.4 oz)   02/10/15 72.2 kg (159 lb 3.2 oz)       ROS:  CONSTITUTIONAL: NEGATIVE for fever, chills, change in weight  INTEGUMENTARY/SKIN: NEGATIVE for worrisome rashes, moles or lesions  EYES: NEGATIVE for vision changes or irritation  ENT: NEGATIVE for ear, mouth and throat problems  RESP: NEGATIVE for significant cough or SOB  CV: NEGATIVE for chest pain, palpitations or peripheral edema  GI: NEGATIVE for nausea, abdominal pain, heartburn, or change in bowel habits   male: negative for dysuria, hematuria, decreased urinary stream, urethral discharge other than as noted above  MUSCULOSKELETAL: NEGATIVE for significant arthralgias or myalgia  NEURO: NEGATIVE for weakness, dizziness or paresthesias  PSYCHIATRIC: high anxiety    OBJECTIVE:   /70   Pulse 83   Temp 99.1  F (37.3  C) (Oral)   Ht 1.778 m (5' 10\")   Wt 66.6 kg (146 lb 12.8 oz)   SpO2 97%   BMI 21.06 kg/m    EXAM:  GENERAL: healthy, alert and no distress  EYES: Eyes grossly normal to inspection, PERRL and conjunctivae and sclerae normal  HENT: ear canals and TM's normal, nose and mouth without ulcers or lesions  NECK: no adenopathy, no asymmetry, masses, or scars and thyroid normal to palpation  RESP: lungs clear to auscultation - no rales, rhonchi or wheezes  CV: regular rate and rhythm, normal S1 S2, no S3 or S4, no murmur, click or rub, no peripheral edema and peripheral pulses strong  ABDOMEN: soft, nontender, no hepatosplenomegaly, no masses and bowel sounds normal   (male): testicular mass left testicle posterior-superior, no hernias and penis normal without urethral discharge  MS: no gross musculoskeletal " defects noted, no edema  SKIN: no suspicious lesions or rashes  NEURO: Normal strength and tone, mentation intact and speech normal  LYMPH: no cervical, supraclavicular, axillary, or inguinal adenopathy  MENTAL STATUS EXAM  Appearance: appropriate  Attitude: cooperative  Behavior: normal  Eye Contact: normal  Speech: pressured  Orientation: oriented to person , place, time and situation  Mood:  Admits anxiety most of time punctuated by very high anxiety and panic  Affect: Reactive/Full range and Anxious/Nervous  Thought Process: somewhat disjointed  Insight: fair    Diagnostic Test Results:  Labs reviewed in Epic  pending    ASSESSMENT/PLAN:   (Z00.00) Routine general medical examination at a health care facility  (primary encounter diagnosis)  Comment:   Plan: Previously healthy male with acute onset changes in his libido and mood.    (F52.0) Lack of libido  Comment:   Plan: Testosterone Free and Total        Discussed the most common causes for lack of libido and reviewed normal test results.  Patient did not clearly answer how much he is currently drinking and is skeptical of alcohol as a cause.  However, alcohol use, Kratom use and anxiety are all implicated in loss of libido.  Despite that it is odd that the loss of libido is reported as total, that onset was abrupt and so different from his previous norm and has been accompanied by what seems to be also an abrupt onset of high anxiety, as well as having a palpable testicular mass.  It is reasonable to check testosterone and testicular US and then have close follow-up.  Discussed process of d/dx.  I would like him to return Friday or early next week and ideally he can bring his wife so that     (F41.1) ANDRIA (generalized anxiety disorder)  Comment:   Plan: Reassured that it is ok to continue hyrooxyzine at this time.  No matter the cause of the anxiety, it is worth thinking about how to attend to anxiety long term, particularly with  on the way    (F33.9)  "Episode of recurrent major depressive disorder, unspecified depression episode severity (H)  Comment:   Plan:     (N50.9) Mass of left testicle  Comment:   Plan: Testosterone Free and Total, US Testicular &         Scrotum w Doppler Ltd            COUNSELING:  Reviewed preventive health counseling, as reflected in patient instructions    Estimated body mass index is 21.06 kg/m  as calculated from the following:    Height as of this encounter: 1.778 m (5' 10\").    Weight as of this encounter: 66.6 kg (146 lb 12.8 oz).         reports that he has never smoked. He has never used smokeless tobacco.      Counseling Resources:  ATP IV Guidelines  Pooled Cohorts Equation Calculator  FRAX Risk Assessment  ICSI Preventive Guidelines  Dietary Guidelines for Americans, 2010  USDA's MyPlate  ASA Prophylaxis  Lung CA Screening    YAQUELIN Saunders [facility]  "

## 2019-08-21 NOTE — TELEPHONE ENCOUNTER
Patient is on my schedule at 11:15 so I am not sure if the message means he is wanting an answer from someone before that or not.  Overall with primary care providers testosterone testing is generally recommended against due to the futility of the results and inappropriateness of testosterone replacement.  If he would like to discuss this in person, this may be more helpful.  LEE Dorsey, YEISON

## 2019-08-21 NOTE — TELEPHONE ENCOUNTER
Can you check if there is any other Primary Care Provider who knows testing testosterone related conditions, I know we cannot check it once in the afternoon, but that is about the extent of my practice. Perhaps you can check if Gracia does this or Lou? I know Jose does not.     Please also be sure pt has scheduled a health maintenance exam, he can schedule with anyone he prefers doesn't have to be with me. He can also discuss with our pt relations rep. If any questions about care and referrals. He did come in with primary concerns of  mental health and libido so I am not sure why he is upset.     Thanks,  Eleanor JAMISON CNP

## 2019-08-21 NOTE — TELEPHONE ENCOUNTER
I would like patient to go ahead and schedule an appt for next week to review results and I'd like him to bring his wife.  One of his concerns was an organic brain problem and it would be helpful to have her insight and about the acute onset changes.  I think it makes the most sense to see someone who knows him or already knows about his current symptoms, so either Dr. Wick or myself ideally to maintain continuity.  LEE Dorsey, AMBARP

## 2019-08-22 ENCOUNTER — HOSPITAL ENCOUNTER (OUTPATIENT)
Dept: ULTRASOUND IMAGING | Facility: CLINIC | Age: 34
Discharge: HOME | End: 2019-08-22
Attending: NURSE PRACTITIONER | Admitting: NURSE PRACTITIONER
Payer: COMMERCIAL

## 2019-08-22 ENCOUNTER — TELEPHONE (OUTPATIENT)
Dept: FAMILY MEDICINE | Facility: CLINIC | Age: 34
End: 2019-08-22

## 2019-08-22 DIAGNOSIS — N50.89 MASS OF LEFT TESTICLE: ICD-10-CM

## 2019-08-22 PROCEDURE — 93976 VASCULAR STUDY: CPT

## 2019-08-22 NOTE — TELEPHONE ENCOUNTER
Writer notes patient seen at office visit 08/21/2019. Closing encounter, no further action required at this time    Kaylan Chisholm, GILBERT  Triage Nurse

## 2019-08-22 NOTE — TELEPHONE ENCOUNTER
Testicular ultrasound was normal.  They did not see a mass.  The testosterone level is still pending.  See other TE regarding message about following up next week.  YEISON Pendleton

## 2019-08-23 LAB
SHBG SERPL-SCNC: 75 NMOL/L (ref 11–80)
TESTOST FREE SERPL-MCNC: 5.85 NG/DL (ref 4.7–24.4)
TESTOST SERPL-MCNC: 503 NG/DL (ref 240–950)

## 2019-08-23 NOTE — TELEPHONE ENCOUNTER
Discussed results - patient agrees with plan    Dorsey - testosterone has returned    Office Visit on 08/21/2019   Component Date Value Ref Range Status     Testosterone Total 08/21/2019 503  240 - 950 ng/dL Final    Comment: This test was developed and its performance characteristics determined by the   [facility],  Special Chemistry Laboratory. It has   not been cleared or approved by the FDA. The laboratory is regulated under   CLIA as qualified to perform high-complexity testing. This test is used for   clinical purposes. It should not be regarded as investigational or for   research.       Sex Hormone Binding Globulin 08/21/2019 75  11 - 80 nmol/L Final     Free Testosterone Calculated 08/21/2019 5.85  4.7 - 24.4 ng/dL Final

## 2019-08-27 ENCOUNTER — TELEPHONE (OUTPATIENT)
Dept: FAMILY MEDICINE | Facility: CLINIC | Age: 34
End: 2019-08-27

## 2019-08-27 NOTE — TELEPHONE ENCOUNTER
Attempted to call number left several times, was disconnected    Left vm at pt number to return call to clinic    Mary Guevara RN   [facility]

## 2019-08-27 NOTE — TELEPHONE ENCOUNTER
Pt has questions about his lab results.    Pt can be reached at [phone]. Ask to page the pt. Do not leave message.

## 2019-08-27 NOTE — TELEPHONE ENCOUNTER
Received call from patient. Pt is questioning the results of the US done 08/22, and if no mass, then why the lump? Discussed that f/u is advised to determine what the lump is. He is wondering what the results of his testosterone were. Writer read results and notified him that we may be reaching out again once reviewed by provider.    Pt states that he has been more anxious about his results because he didn't feel that messages were explained or relayed very clearly, he felt that information was potentially being withheld when he was given verbal messages over the phone. He felt he wasn't given a good explanation as to why he was coming back for f/u or why he was to bring his wife. Writer read messages to patient from TE 08/21/19 and 08/22/19. Pt verbalized understanding. Pt was in agreement to f/u regarding results, lump, and to discuss organic brain problem at his upcoming appt on 09/06.     Writer advised patient that if he has any changes in symptoms or worsening of symptoms that occurs prior to his scheduled appt, then he would want to notify us. He verbalized understanding.    Elisabet Peoples RN  [facility]

## 2019-09-06 ENCOUNTER — OFFICE VISIT (OUTPATIENT)
Dept: FAMILY MEDICINE | Facility: CLINIC | Age: 34
End: 2019-09-06
Payer: COMMERCIAL

## 2019-09-06 VITALS
TEMPERATURE: 98.4 F | HEART RATE: 72 BPM | DIASTOLIC BLOOD PRESSURE: 72 MMHG | WEIGHT: 147.9 LBS | BODY MASS INDEX: 21.17 KG/M2 | OXYGEN SATURATION: 96 % | SYSTOLIC BLOOD PRESSURE: 112 MMHG | HEIGHT: 70 IN

## 2019-09-06 DIAGNOSIS — F41.1 GAD (GENERALIZED ANXIETY DISORDER): Primary | ICD-10-CM

## 2019-09-06 PROCEDURE — 99215 OFFICE O/P EST HI 40 MIN: CPT | Performed by: NURSE PRACTITIONER

## 2019-09-06 RX ORDER — PROPRANOLOL HYDROCHLORIDE 20 MG/1
20 TABLET ORAL 3 TIMES DAILY PRN
Qty: 90 TABLET | Refills: 0 | Status: SHIPPED | OUTPATIENT
Start: 2019-09-06 | End: 2021-04-01

## 2019-09-06 ASSESSMENT — MIFFLIN-ST. JEOR: SCORE: 1612.12

## 2019-09-06 NOTE — PROGRESS NOTES
"Subjective     Sampson Sal is a 33 year old male who presents to clinic today for the following health issues:    HPI   Loss of libido  Anxiety      Duration: a couple weeks     Description (location/character/radiation): would like to follow up on results from a week ago     Intensity:  mild    Accompanying signs and symptoms: none    History (similar episodes/previous evaluation): None    Precipitating or alleviating factors: None    Therapies tried and outcome: None     Patient reports:  Libido is maybe up a little bit since last visit.  Times when pulse feels fast and tightness in chest.  Feels lethargic, drop in motivation when previously had motivation  Seems abrupt to him and he finds it hard to believe anxiety can cause this  Worries about the state of the world and has changed/intensified since expecting a baby.  Thinks a lot about if they should start a family, but wants to start a family.  Hydroxyzine has been helpful, but also doesn't like the sedative side effect.  Less drinking, but still does have alcohol.  Had been drinking a lot, then took Kratom, stopped Kratom and now has <1 drink a day.  History of recreational drug use and states that it would not be good for him to have something like xanax.      Wife (Eric) reports:  She has noticed patient having a hard time focusing and \"distant\" or disengaged (\"but not in a mean way\").  Had been attributing this to stress at work.  Feels the symptoms patient reports started around time of second ultrasound and when she was on pelvic rest.  Baby (Martha) is due on Halloween.    They both deny abrupt cognitive changes such as acute change in personality, language, coordination, or memory.  The patient is very wary of SSRIs, feeling that they change people's brains, he doesn't want to be dependent on a medication for his whole life and does not want to have side effects.  He does not want to start a medication.  He also feels skeptical of therapy and feels he " "would be rude to a therapist.  Also worried about cost of therapy.  Had previously exercised regularly and is most interested in this for treatment of mental health.    Reviewed and updated as needed this visit by Provider         Review of Systems   ROS COMP: CONSTITUTIONAL: NEGATIVE for fever, chills, change in weight  ENT/MOUTH: NEGATIVE for ear, mouth and throat problems  RESP: NEGATIVE for significant cough or SOB  CV: NEGATIVE for chest pain, palpitations or peripheral edema      Objective    /72   Pulse 72   Temp 98.4  F (36.9  C) (Oral)   Ht 1.778 m (5' 10\")   Wt 67.1 kg (147 lb 14.4 oz)   SpO2 96%   BMI 21.22 kg/m    Body mass index is 21.22 kg/m .  Physical Exam   GENERAL: healthy, alert and no distress  MENTAL STATUS EXAM  Appearance: appropriate  Attitude: cooperative, \"tight\"  Behavior: normal  Eye Contact: normal  Speech: normal  Orientation: oriented to person , place, time and situation  Mood:  admits anxiety most of time  Affect: Appropriate/mood-congruent, Restricted and Anxious/Nervous  Thought Process: clear  Suicidal Ideation: reports no thoughts, no intention  Hallucination: no    Diagnostic Test Results:  Labs reviewed in Epic  none       Please note greater than 50% of this 50 minute appointment were spent face-to-face in counseling with the patient of the issues described above in the history of present illness and in the plan, including review of symptoms and diagnostics and most likely diagnosis, anxiety treatment options including medication, counseling, exercise, diet changes, meditation and supportive community, as well as side effects of medications.     Assessment & Plan     (F41.1) ANDRIA (generalized anxiety disorder)  (primary encounter diagnosis)  Comment:   Plan: propranolol (INDERAL) 20 MG tablet      At this time his symptoms are very consistent with anxiety and physical manifestations of anxiety.  His libido has returned somewhat and the onset of symptoms correlated " with reasonable situational changes of expected child. Discussed negative tests and no real new or significant symptoms indicating other testing at this time.  Recommend starting an serotonin specific reuptake inhibitor - particularly with infant due in two months, but patient really does not want to start a medication.  Discussed therapy recommendation and exercise in lieu of medication which can be very effective with commitment.  Patient does not think he will follow-up on therapy.  Discussed exercise, turning off news and negative input, SAAD-E supplement, magnesium for sleep, meditation and book resources.           Patient Instructions   Mental health support    - eat a balanced diet with lots of fruits, vegetables, protein, and whole grains   (organic best).  Try decreasing or avoiding sugars, trans fats, and white flour  -consider these supplements daily:    multiple vitamins or B complex with at least 50 mg of B6,    fish oil 1000 mg twice daily or 2 TBS ground flax seed meal    Vit D3 7898-4574 IU     SAAD-E supplement   Probiotics (healthy bacteria) especially if stomach or bowel symptoms   - exercise regularly with at least 30 minutes of movement daily or 5 hours per week  - sleep at least 8 hours per night    If having difficulty getting to sleep, try Magnesium glycinate 400-600 mg or    melatonin 1-3 mg in the evening    If having difficulty staying asleep, try L-theanine 100-200 mg at bedtime or   Passionflower tincture, tea or capsule  -practice meditation or relaxation daily   Yoga, Johnson Chi, Qi Gong, sitting or walking meditation or   whatever you do that is meditative--that which empties the mind of worry   Sewing, reading, cooking, gardening, fishing, praying, etc  -consider starting or continuing in counseling or therapy   Contact your health insurance for resources  -seek support from friends and family and dmitry communities   -other alternative therapies may also be of help such as acupuncture,  "homeopathy,  traditional chinese medicine, massage, etc  -be in nature and around trees - \"[place] Therapy\"      In addition to the above important treatments, antidepressant meds may be prescribed.   Drugs of the SSRI class can have side effects such as weight gain, sexual dysfunction, insomnia, headache, nausea, and initially increases in anxiety.  Let me know if significant side effects do occur.    Follow up in clinic in 8 weeks for recheck; sooner if symptoms should worsen.        Resources/Books:  Unstuck by Julius Braun     The Chemistry of Harika  by Mckinley Piña     The Chemistry of Calm  by Mckinley Piña     Total Catastrophe Living by Denys Loving    Mindfulness-Based Stress Reduction Classes:    [facility] [place]  [email or web address]  [phone]    [facility]  [email or web address]    [facility]  [email or web address]    [facility] [place]  [email or web address]              Return in about 2 months (around 11/6/2019) for mental health check.    YAQUELIN Saunders [facility]        "

## 2019-09-06 NOTE — PATIENT INSTRUCTIONS
"Mental health support    - eat a balanced diet with lots of fruits, vegetables, protein, and whole grains   (organic best).  Try decreasing or avoiding sugars, trans fats, and white flour  -consider these supplements daily:    multiple vitamins or B complex with at least 50 mg of B6,    fish oil 1000 mg twice daily or 2 TBS ground flax seed meal    Vit D3 5529-5573 IU     SAAD-E supplement   Probiotics (healthy bacteria) especially if stomach or bowel symptoms   - exercise regularly with at least 30 minutes of movement daily or 5 hours per week  - sleep at least 8 hours per night    If having difficulty getting to sleep, try Magnesium glycinate 400-600 mg or    melatonin 1-3 mg in the evening    If having difficulty staying asleep, try L-theanine 100-200 mg at bedtime or   Passionflower tincture, tea or capsule  -practice meditation or relaxation daily   Yoga, Johnson Chi, Qi Gong, sitting or walking meditation or   whatever you do that is meditative--that which empties the mind of worry   Sewing, reading, cooking, gardening, fishing, praying, etc  -consider starting or continuing in counseling or therapy   Contact your health insurance for resources  -seek support from friends and family and dmitry communities   -other alternative therapies may also be of help such as acupuncture, homeopathy,  traditional chinese medicine, massage, etc  -be in nature and around trees - \"[place] Therapy\"      In addition to the above important treatments, antidepressant meds may be prescribed.   Drugs of the SSRI class can have side effects such as weight gain, sexual dysfunction, insomnia, headache, nausea, and initially increases in anxiety.  Let me know if significant side effects do occur.    Follow up in clinic in 8 weeks for recheck; sooner if symptoms should worsen.        Resources/Books:  Unstuck by Julius Braun     The Chemistry of Harika  by Mckinley Piña     The Chemistry of Calm  by Mckinley Piña     Total Catastrophe Living by Denys " Inder    Mindfulness-Based Stress Reduction Classes:    [facility]  [email or web address]  632.425.2018    [facility]  www.[employer])/classes    [facility]  [email or web address]    [facility]  www.[email or web address].[employer]

## 2019-10-07 ENCOUNTER — TELEPHONE (OUTPATIENT)
Dept: FAMILY MEDICINE | Facility: CLINIC | Age: 34
End: 2019-10-07

## 2019-10-07 NOTE — TELEPHONE ENCOUNTER
Patient called stated that he was seen with Judy on 9/6/19.    Patient stated that his symptoms have not gotten better.    Patient stated he wanted to talk with Judy directly not the nurse.    Patient stated that he still has some tightness in his chest and neck, patient would like to know what her recommendations will be moving forward.    Patient can be reached @ [phone]  Okay to noreen

## 2019-10-10 NOTE — TELEPHONE ENCOUNTER
Left vm for pt, please set up either a phone visit or OV with a provider    Mary Guevara RN   [facility]

## 2019-12-03 NOTE — PROGRESS NOTES
Subjective     Sampson Sal is a 34 year old male who presents to clinic today for the following health issues:    HPI   Anxiety Follow-Up    How are you doing with your anxiety since your last visit? Improved to no change     Are you having other symptoms that might be associated with anxiety? Yes:  weird heart beat    Have you had a significant life event? OTHER: wife gave birth recently - one month old, milk supply is low and they are supplementing, feeling ok about it, patient himself is getting less, but adequate sleep with average of 6 hours nightly.    Are you feeling depressed? No    Do you have any concerns with your use of alcohol or other drugs? No    A couple weeks ago felt that his heartbeat was erratic and wife palpated the irregularity.  He was seen at urgent care and had normal EKG.  The recommended a Holter.  This was two weeks ago and hasn't had irregular heartbeat episodes since.      Has taken propranolol about 20 times over the past three months for anxiety - not sure it helped.  Had taken with the recent episode thinking the irregular heartbeat may be anxiety related.  He did *not* take it the day he went to the ED. Has taken the hydroxyzine 10-15 times over the past three months and not at all in the last two months.    Influenza shot at work October 2019    Social History     Tobacco Use     Smoking status: Never Smoker     Smokeless tobacco: Never Used   Substance Use Topics     Alcohol use: Yes     Drug use: No     ANDRIA-7 SCORE 8/19/2019 12/4/2019   Total Score 12 1     PHQ 8/19/2019   PHQ-9 Total Score 13   Q9: Thoughts of better off dead/self-harm past 2 weeks Not at all         How many servings of fruits and vegetables do you eat daily?  4 or more    On average, how many sweetened beverages do you drink each day (Examples: soda, juice, sweet tea, etc.  Do NOT count diet or artificially sweetened beverages)?   1  How many days per week do you miss taking your medication? Only taking it as  "needed     What makes it hard for you to take your medications?  none      Reviewed and updated as needed this visit by Provider         Review of Systems   ROS COMP: Constitutional, HEENT, cardiovascular, pulmonary, gi and gu systems are negative, except as otherwise noted.      Objective    /72   Pulse 50   Temp 98.4  F (36.9  C) (Oral)   Ht 1.778 m (5' 10\")   Wt 67.6 kg (149 lb)   SpO2 99%   BMI 21.38 kg/m    Body mass index is 21.38 kg/m .  Physical Exam   GENERAL: healthy, alert and no distress  NECK: no adenopathy, no asymmetry, masses, or scars and thyroid normal to palpation  RESP: lungs clear to auscultation - no rales, rhonchi or wheezes  CV: regular rate and rhythm, normal S1 S2, no S3 or S4, no murmur, click or rub, no peripheral edema and peripheral pulses strong  NEURO: Normal strength and tone, mentation intact and speech normal  PSYCH: mentation appears normal and anxious    Diagnostic Test Results:  Labs reviewed in Epic  none       Please note greater than 50% of this 25 minute appointment were spent face-to-face in counseling with the patient of the issues described above in the history of present illness and in the plan, including review of diagnostics to date and significance, common arrhythmias and how we diagnose and respond to those, treatment options for anxiety.  Assessment & Plan     (I49.9) Irregular heart beats  (primary encounter diagnosis)  Comment:   Plan: Zio Patch Holter Adult Pediatric Greater than         48 hrs   Discussed that the most likely arrhythmia are PVCs and what those heartbeats are, how they arise and the (non) significance in most cases.  Since he is currently not having the level of episodes that he was having a couple weeks ago a Holter is not likely to capture the arrhythmia not be at the frequency to necessitate follow-up.   Should he return to a phase where he is having daily episodes, he has an order for holter that he can initiate.    (F41.1) ANDRIA " (generalized anxiety disorder)  Comment:   Plan: Not interested in medication aside from hydroxyzine at this time.  Did discuss that high doses of hydroxyzine daily could potentiate irregular heartbeat, but patient has not taken in that way to date.       Patient Instructions   If you start to have multiple days per week of irregular heart beats, then go ahead and schedule the extended monitoring by calling [phone].      Patient Education     Understanding Premature Ventricular Contractions (PVCs)  Premature ventricular contractions (PVCs) are a type of abnormal heartbeat (arrhythmia). They are common ly found in people of all ages.    How PVCs happen    Your heart has 4 chambers: 2 upper atria and 2 lower ventricles. Normally, a special group of cells begins the signal to start your heartbeat. These cells are in the sinoatrial (SA) node in the right atrium. The signal quickly travels down your heart s conducting system. It travels to the left and right ventricle. As it travels, the signal triggers nearby parts of your heart to contract. This allows your heart to squeeze in a coordinated way.  During a premature ventricular contraction, the signal to start your heartbeat instead comes from one of the ventricles. This signal is premature, meaning it happens before the SA node has had a chance to fire. The signal spreads through the rest of your heart, causing a heartbeat. If this happens very soon after the previous heartbeat, your heart will push out very little blood. This causes a feeling of a pause between beats. If it happens a little later, your heart pushes out an almost normal amount of blood. This leads to a feeling of an extra heartbeat. So, the heart has a  premature  heartbeat in between normal heartbeats.  What causes PVCs?  Certain things can help set off a premature signal in the ventricles. These include:    Advancing age    Reduced blood flow to your heart (such as coronary artery  disease)    Scarring after a heart attack    Electrolyte problems, such as low sodium or potassium levels    Increased adrenaline, such as with anxiety    Certain medicines, like digoxin  Many heart conditions raise the risk for PVCs. These include:    Mitral valve prolapse    High blood pressure    Heart attack    Coronary heart disease    Dilated cardiomyopathy    Hypertrophic cardiomyopathy    Congenital heart disease    Heart failure  They often happen in people without any heart disease. However, PVCs are somewhat more common in people with some kind of heart disease.   What are the symptoms of PVCs?  Most people with occasional PVCs don t have symptoms. The more PVCs you have, the more likely you are to feel them. When symptoms do happen, they are usually minor. Symptoms may include:    An awareness of the heart beating    A fluttering or flip-flop feeling in your chest    Feeling of a  skipped  or  extra  heartbeat    Dizziness and near-fainting    A pulsing sensation in the neck  PVCs may cause more severe symptoms if you have another heart problem, such as heart failure.   How are PVCs diagnosed?  Your healthcare provider will ask about your medical history and give you a physical exam. An electrocardiogram (ECG) is the main test for diagnosis. This test records the electrical activity of your heart. During an ECG, small pads (electrodes) are placed on your chest, arms, and legs. Wires connect the pads to a machine, which records your heart s electrical signals. This test allows your provider to look at the signal of your heartbeat for a brief time. Any PVCs that occur during this time will show up on the ECG. In some cases, your healthcare provider might advise ECG monitoring over a day or more, up to 30 days. This can help to catch PVCs that don t happen often. This is done with a monitor you wear night and day for the test period. Heart monitor. There are 2 types of external heart monitors:    Holter  monitor. This monitor can be worn for 1 to 7 days. It provides a constant recording of heart activity. After the test is done, your health care provider analyzes the recording.    Event monitors. These monitors can be used for 3 to 4 weeks. One kind is a memory loop recorder. This monitor records constantly, but stores the recording only when you press a button. The other kind is a credit card-sized recorder. This monitor is turned on only during an episode. With both types, you send the recordings of symptoms to your health care provider over the phone.  These may be the only tests your healthcare provider will need. You may need more testing if you have PVCs often, or many in a row. Your provider may look at other causes, including possible heart problems. These tests might include:    Echocardiography. This test looks at your heart s structure and function.    Cardiac stress testing. This test checks how your heart responds to exercise and to evaluate heart artery blood flow.    Cardiac CT or MRI     Blood tests. This is done to check potassium and thyroid levels.  Date Last Reviewed: 11/1/2017 2000-2018 [employer]. [address] [place] [address], [place] [address]. All rights reserved. This information is not intended as a substitute for professional medical care. Always follow your healthcare professional's instructions.               Return in about 1 year (around 12/4/2020) for Physical Exam.    YAQUELIN [facility]

## 2019-12-04 ENCOUNTER — OFFICE VISIT (OUTPATIENT)
Dept: FAMILY MEDICINE | Facility: CLINIC | Age: 34
End: 2019-12-04
Payer: COMMERCIAL

## 2019-12-04 VITALS
OXYGEN SATURATION: 99 % | WEIGHT: 149 LBS | SYSTOLIC BLOOD PRESSURE: 120 MMHG | BODY MASS INDEX: 21.33 KG/M2 | TEMPERATURE: 98.4 F | HEIGHT: 70 IN | HEART RATE: 50 BPM | DIASTOLIC BLOOD PRESSURE: 72 MMHG

## 2019-12-04 DIAGNOSIS — F41.1 GAD (GENERALIZED ANXIETY DISORDER): ICD-10-CM

## 2019-12-04 DIAGNOSIS — I49.9 IRREGULAR HEART BEATS: Primary | ICD-10-CM

## 2019-12-04 PROCEDURE — 99214 OFFICE O/P EST MOD 30 MIN: CPT | Performed by: NURSE PRACTITIONER

## 2019-12-04 ASSESSMENT — ANXIETY QUESTIONNAIRES
2. NOT BEING ABLE TO STOP OR CONTROL WORRYING: NOT AT ALL
GAD7 TOTAL SCORE: 1
3. WORRYING TOO MUCH ABOUT DIFFERENT THINGS: NOT AT ALL
5. BEING SO RESTLESS THAT IT IS HARD TO SIT STILL: NOT AT ALL
7. FEELING AFRAID AS IF SOMETHING AWFUL MIGHT HAPPEN: SEVERAL DAYS
6. BECOMING EASILY ANNOYED OR IRRITABLE: NOT AT ALL
IF YOU CHECKED OFF ANY PROBLEMS ON THIS QUESTIONNAIRE, HOW DIFFICULT HAVE THESE PROBLEMS MADE IT FOR YOU TO DO YOUR WORK, TAKE CARE OF THINGS AT HOME, OR GET ALONG WITH OTHER PEOPLE: NOT DIFFICULT AT ALL
1. FEELING NERVOUS, ANXIOUS, OR ON EDGE: NOT AT ALL

## 2019-12-04 ASSESSMENT — PATIENT HEALTH QUESTIONNAIRE - PHQ9: 5. POOR APPETITE OR OVEREATING: NOT AT ALL

## 2019-12-04 ASSESSMENT — MIFFLIN-ST. JEOR: SCORE: 1612.11

## 2019-12-04 NOTE — PATIENT INSTRUCTIONS
If you start to have multiple days per week of irregular heart beats, then go ahead and schedule the extended monitoring by calling [phone].      Patient Education     Understanding Premature Ventricular Contractions (PVCs)  Premature ventricular contractions (PVCs) are a type of abnormal heartbeat (arrhythmia). They are common ly found in people of all ages.    How PVCs happen    Your heart has 4 chambers: 2 upper atria and 2 lower ventricles. Normally, a special group of cells begins the signal to start your heartbeat. These cells are in the sinoatrial (SA) node in the right atrium. The signal quickly travels down your heart s conducting system. It travels to the left and right ventricle. As it travels, the signal triggers nearby parts of your heart to contract. This allows your heart to squeeze in a coordinated way.  During a premature ventricular contraction, the signal to start your heartbeat instead comes from one of the ventricles. This signal is premature, meaning it happens before the SA node has had a chance to fire. The signal spreads through the rest of your heart, causing a heartbeat. If this happens very soon after the previous heartbeat, your heart will push out very little blood. This causes a feeling of a pause between beats. If it happens a little later, your heart pushes out an almost normal amount of blood. This leads to a feeling of an extra heartbeat. So, the heart has a  premature  heartbeat in between normal heartbeats.  What causes PVCs?  Certain things can help set off a premature signal in the ventricles. These include:    Advancing age    Reduced blood flow to your heart (such as coronary artery disease)    Scarring after a heart attack    Electrolyte problems, such as low sodium or potassium levels    Increased adrenaline, such as with anxiety    Certain medicines, like digoxin  Many heart conditions raise the risk for PVCs. These include:    Mitral valve prolapse    High blood  pressure    Heart attack    Coronary heart disease    Dilated cardiomyopathy    Hypertrophic cardiomyopathy    Congenital heart disease    Heart failure  They often happen in people without any heart disease. However, PVCs are somewhat more common in people with some kind of heart disease.   What are the symptoms of PVCs?  Most people with occasional PVCs don t have symptoms. The more PVCs you have, the more likely you are to feel them. When symptoms do happen, they are usually minor. Symptoms may include:    An awareness of the heart beating    A fluttering or flip-flop feeling in your chest    Feeling of a  skipped  or  extra  heartbeat    Dizziness and near-fainting    A pulsing sensation in the neck  PVCs may cause more severe symptoms if you have another heart problem, such as heart failure.   How are PVCs diagnosed?  Your healthcare provider will ask about your medical history and give you a physical exam. An electrocardiogram (ECG) is the main test for diagnosis. This test records the electrical activity of your heart. During an ECG, small pads (electrodes) are placed on your chest, arms, and legs. Wires connect the pads to a machine, which records your heart s electrical signals. This test allows your provider to look at the signal of your heartbeat for a brief time. Any PVCs that occur during this time will show up on the ECG. In some cases, your healthcare provider might advise ECG monitoring over a day or more, up to 30 days. This can help to catch PVCs that don t happen often. This is done with a monitor you wear night and day for the test period. Heart monitor. There are 2 types of external heart monitors:    Holter monitor. This monitor can be worn for 1 to 7 days. It provides a constant recording of heart activity. After the test is done, your health care provider analyzes the recording.    Event monitors. These monitors can be used for 3 to 4 weeks. One kind is a memory loop recorder. This monitor  records constantly, but stores the recording only when you press a button. The other kind is a credit card-sized recorder. This monitor is turned on only during an episode. With both types, you send the recordings of symptoms to your health care provider over the phone.  These may be the only tests your healthcare provider will need. You may need more testing if you have PVCs often, or many in a row. Your provider may look at other causes, including possible heart problems. These tests might include:    Echocardiography. This test looks at your heart s structure and function.    Cardiac stress testing. This test checks how your heart responds to exercise and to evaluate heart artery blood flow.    Cardiac CT or MRI     Blood tests. This is done to check potassium and thyroid levels.  Date Last Reviewed: 11/1/2017 2000-2018 [employer]. [address] [place] [address], [place] [address]. All rights reserved. This information is not intended as a substitute for professional medical care. Always follow your healthcare professional's instructions.

## 2019-12-05 ASSESSMENT — ANXIETY QUESTIONNAIRES: GAD7 TOTAL SCORE: 1

## 2021-03-31 ASSESSMENT — PATIENT HEALTH QUESTIONNAIRE - PHQ9: SUM OF ALL RESPONSES TO PHQ QUESTIONS 1-9: 4

## 2021-03-31 NOTE — PROGRESS NOTES
"  SUBJECTIVE:   CC: Sampson Sal is an 35 year old male who presents for preventive health visit.     Patient has been advised of split billing requirements and indicates understanding: Yes  Healthy Habits:    Do you get at least three servings of calcium containing foods daily (dairy, green leafy vegetables, etc.)? yes    Amount of exercise or daily activities, outside of work: No    Problems taking medications regularly No-but he discontinued his medications as he was better-    Medication side effects: No    Have you had an eye exam in the past two years? no    Do you see a dentist twice per year? yes    Do you have sleep apnea, excessive snoring or daytime drowsiness?no    Daughter is 1 1/2 years old  Works in lab so working onsite entire pandemic    Depression and Anxiety Follow-Up    How are you doing with your depression since your last visit? Resolved, pt feels his symptoms have resolved and was only something he had for a short time    How are you doing with your anxiety since your last visit?  Improved     Are you having other symptoms that might be associated with depression or anxiety? No    Have you had a significant life event? OTHER: New child     Do you have any concerns with your use of alcohol or other drugs? No but he does drink just not on a schedule    Had initially wanted to talk about long-term GI issues - always felt uncomfortable, irregular bathroom schedule, irritated by alcohol.  But symptoms seem to have resolved.  Reports that \"a long time ago\" he had blood in his stool and went to GI where he had a colonoscopy which showed proctitis.  Does not think that there was a biopsy of the inflamed area.  Helped to stop drinking alcohol and haven't returned when he restarted small amount of drinking.  Started taking a probiotic and magnesium (unsure dose).  Previously provider said IBS.      Doesn't make as much semen anymore and decreased sensation of orgasm.  Continues to feel lump-type area in " left testicle that has been evaluated by US and been negative.    Social History     Tobacco Use     Smoking status: Never Smoker     Smokeless tobacco: Never Used   Substance Use Topics     Alcohol use: Yes     Drug use: No     PHQ 8/19/2019 3/31/2021   PHQ-9 Total Score 13 4   Q9: Thoughts of better off dead/self-harm past 2 weeks Not at all Not at all     ANDRIA-7 SCORE 8/19/2019 12/4/2019   Total Score 12 1     Last PHQ-9 3/31/2021   1.  Little interest or pleasure in doing things 1   2.  Feeling down, depressed, or hopeless 1   3.  Trouble falling or staying asleep, or sleeping too much 0   4.  Feeling tired or having little energy 0   5.  Poor appetite or overeating 0   6.  Feeling bad about yourself 1   7.  Trouble concentrating 1   8.  Moving slowly or restless 0   Q9: Thoughts of better off dead/self-harm past 2 weeks 0   PHQ-9 Total Score 4   Difficulty at work, home, or with people Somewhat difficult     ANDRIA-7  12/4/2019   1. Feeling nervous, anxious, or on edge 0   2. Not being able to stop or control worrying 0   3. Worrying too much about different things 0   4. Trouble relaxing 0   5. Being so restless that it is hard to sit still 0   6. Becoming easily annoyed or irritable 0   7. Feeling afraid, as if something awful might happen 1   ANDRIA-7 Total Score 1   If you checked any problems, how difficult have they made it for you to do your work, take care of things at home, or get along with other people? Not difficult at all       Today's PHQ-2 Score:   PHQ-2 ( 1999 [employer]) 12/4/2019 9/6/2019   Q1: Little interest or pleasure in doing things 0 0   Q2: Feeling down, depressed or hopeless 0 0   PHQ-2 Score 0 0       Abuse: Current or Past(Physical, Sexual or Emotional)- No  Do you feel safe in your environment? Yes    Have you ever done Advance Care Planning? (For example, a Health Directive, POLST, or a discussion with a medical provider or your loved ones about your wishes): No, advance care planning  "information given to patient to review.  Patient plans to discuss their wishes with loved ones or provider.      Social History     Tobacco Use     Smoking status: Never Smoker     Smokeless tobacco: Never Used   Substance Use Topics     Alcohol use: Yes     If you drink alcohol do you typically have >3 drinks per day or >7 drinks per week? No                      Last PSA: No results found for: PSA    Reviewed orders with patient. Reviewed health maintenance and updated orders accordingly - Yes  Lab work is in process  Labs reviewed in EPIC    Reviewed and updated as needed this visit by clinical staff  Tobacco  Allergies  Meds  Problems  Med Hx  Surg Hx  Fam Hx  Soc Hx          Reviewed and updated as needed this visit by Provider     Problems                ROS:  CONSTITUTIONAL: NEGATIVE for fever, chills, change in weight  INTEGUMENTARY/SKIN: NEGATIVE for worrisome rashes, moles or lesions  EYES: NEGATIVE for vision changes or irritation  ENT: NEGATIVE for ear, mouth and throat problems  RESP: NEGATIVE for significant cough or SOB  CV: NEGATIVE for chest pain, palpitations or peripheral edema  GI: NEGATIVE for nausea, abdominal pain, heartburn, or change in bowel habits   male: negative for dysuria, hematuria, decreased urinary stream, erectile dysfunction, urethral discharge  MUSCULOSKELETAL: NEGATIVE for significant arthralgias or myalgia  NEURO: NEGATIVE for weakness, dizziness or paresthesias  PSYCHIATRIC: NEGATIVE for changes in mood or affect    OBJECTIVE:   /64   Pulse 66   Temp 96.8  F (36  C) (Temporal)   Ht 1.79 m (5' 10.47\")   Wt 68.5 kg (151 lb)   SpO2 100%   BMI 21.38 kg/m    EXAM:  GENERAL: healthy, alert and no distress  EYES: Eyes grossly normal to inspection, PERRL and conjunctivae and sclerae normal  HENT: ear canals and TM's normal, nose and mouth without ulcers or lesions  NECK: no adenopathy, no asymmetry, masses, or scars and thyroid normal to palpation  RESP: lungs " clear to auscultation - no rales, rhonchi or wheezes  CV: regular rate and rhythm, normal S1 S2, no S3 or S4, no murmur, click or rub, no peripheral edema and peripheral pulses strong  ABDOMEN: soft, nontender, no hepatosplenomegaly, no masses and bowel sounds normal   (male): normal male genitalia without lesions or urethral discharge, no hernia - left varicocele  MS: no gross musculoskeletal defects noted, no edema  SKIN: no suspicious lesions or rashes  NEURO: Normal strength and tone, mentation intact and speech normal  PSYCH: mentation appears normal, affect normal/bright  LYMPH: no cervical, supraclavicular, axillary, or inguinal adenopathy    Diagnostic Test Results:  Labs reviewed in Epic  none     ASSESSMENT/PLAN:   (Z00.00) Routine general medical examination at a health care facility  (primary encounter diagnosis)  Comment:   Plan:     (F41.1) ANDRIA (generalized anxiety disorder)  Comment:   Plan: Patient feels this has resolved    (Z86.59) History of major depression  Comment:   Plan: Resolved    (Z87.19) Hx of ulcerative colitis  Comment:   Plan: GASTROENTEROLOGY ADULT REF CONSULT ONLY,         GASTROENTEROLOGY ADULT REF CONSULT ONLY      Reviewed abstracted notes from [facility] 2018 in which the colonoscopy indicated IBD and biopsy confirmed UC.  Final consult note documents no treatment started due to hesitation and referral to IBD-specific provider.  Patient does not recall the visit including information about the importance of treatment and regular follow-up.  Discussed the relapsing and remitting nature of IBD as well as the high incidence of complication requiring surgery for uncontrolled IBD.  Patient has been seen at [facility], but not by their IBD doctors.  I recommend seeing an IBD-specific provider to review history and plans going forward - either treatment or monitoring alone.    (N53.8) Low volume of ejaculated semen  Comment:   Plan: UROLOGY ADULT REFERRAL              Patient has been advised of  "split billing requirements and indicates understanding: Yes  COUNSELING:  Reviewed preventive health counseling, as reflected in patient instructions    Estimated body mass index is 21.38 kg/m  as calculated from the following:    Height as of this encounter: 1.79 m (5' 10.47\").    Weight as of this encounter: 68.5 kg (151 lb).        He reports that he has never smoked. He has never used smokeless tobacco.      Counseling Resources:  ATP IV Guidelines  Pooled Cohorts Equation Calculator  FRAX Risk Assessment  ICSI Preventive Guidelines  Dietary Guidelines for Americans, 2010  USDA's MyPlate  ASA Prophylaxis  Lung CA Screening    Itzel Dorsey, YAQUELIN CNP  [facility]  "

## 2021-03-31 NOTE — PATIENT INSTRUCTIONS
Places for covid vaccine information/appointments    FB group [place] [employer] - often have good tips  [email or web address]  [email or web address]  State covid vaccine Connector (lottery) - https://vaccineconnector.[place].gov[place]/  Lia: Leif or 8-962-ZQSULBVZ      Preventive Health Recommendations  Male Ages 26 - 39    Yearly exam:             See your health care provider every year in order to  o   Review health changes.   o   Discuss preventive care.    o   Review your medicines if your doctor has prescribed any.    You should be tested each year for STDs (sexually transmitted diseases), if you re at risk.     After age 35, talk to your provider about cholesterol testing. If you are at risk for heart disease, have your cholesterol tested at least every 5 years.     If you are at risk for diabetes, you should have a diabetes test (fasting glucose).  Shots: Get a flu shot each year. Get a tetanus shot every 10 years.     Nutrition:    Eat at least 5 servings of fruits and vegetables daily.     Eat whole-grain bread, whole-wheat pasta and brown rice instead of white grains and rice.     Get adequate Calcium and Vitamin D.     Lifestyle    Exercise for at least 150 minutes a week (30 minutes a day, 5 days a week). This will help you control your weight and prevent disease.     Limit alcohol to one drink per day.     No smoking.     Wear sunscreen to prevent skin cancer.     See your dentist every six months for an exam and cleaning.       Patient Education     What is Ulcerative Colitis?  Ulcerative colitis is a long-term (chronic) disease. It causes swelling (inflammation) and sores (ulcers) in the inner lining of the rectum and colon. It is a form of inflammatory bowel disease (IBD). No one knows for sure what causes it. But symptoms can be treated. People with this disease can lead full, active lives.        Ulcerative colitis affects the inside layers of the rectum and colon.   Symptoms of  ulcerative colitis  Symptoms often have to do with bowel movements. You may have:     Frequent, loose bowel movements    Blood and pus in stools, or rectal bleeding    Feeling that you didn t fully empty your bowels (incomplete bowel movement)    Feeling that you need to have a bowel movement right away (urgency)    Belly (abdominal) cramps    Loss of appetite, weight loss    Nausea    Feeling very tired (fatigue)    Anemia    Joint pain    Rectal pain that comes and goes    Eye pain or redness    Certain rashes  Your treatment options  Your healthcare provider will ask about your past health and family history. He or she will also give you a physical exam. You may also need these tests:     Lab tests. Your blood and stool will be checked.    Endoscopies of the large intestine. These tests are the most accurate way to diagnose this condition. They use a long, flexible tube with a tiny light and camera on one end. They check the inside of your large intestine.  Medicines  Your healthcare provider will try to find the medicines that work best for you. You may take:     A type of anti-inflammatory medicine (called 5-ASA compounds or mesalamine) to help reduce swelling and inflammation in the intestines    Corticosteroids to help reduce inflammation    Antibiotics to fight bacteria, if there is an infection    Medicines to control your body s immune system (such as immunomodulators or biologics)  Lifestyle changes      Certain foods can make your symptoms worse. You may need to change what you eat. Stay away from any food that makes your symptoms worse. These foods vary from person to person. But certain foods cause symptoms in many people. These include high-fiber foods (such as fresh vegetables) and high-fat foods (such as dairy products and red meat). Keep track of foods that cause you problems.    Stress can also worsen symptoms. Reducing stress may help. Methods like relaxation exercises, meditation, and deep  breathing can help you control stress. Your healthcare provider may be able to tell you more about these.  If surgery is needed  Surgery may help control or even cure ulcerative colitis. It is done to take out a severely affected part of the colon. If this is an option for you, your provider can tell you more.   Bernadine last reviewed this educational content on 6/1/2019 2000-2020 [employer]. All rights reserved. This information is not intended as a substitute for professional medical care. Always follow your healthcare professional's instructions.

## 2021-04-01 ENCOUNTER — OFFICE VISIT (OUTPATIENT)
Dept: FAMILY MEDICINE | Facility: CLINIC | Age: 36
End: 2021-04-01
Payer: COMMERCIAL

## 2021-04-01 VITALS
BODY MASS INDEX: 21.62 KG/M2 | SYSTOLIC BLOOD PRESSURE: 116 MMHG | WEIGHT: 151 LBS | HEART RATE: 66 BPM | HEIGHT: 70 IN | TEMPERATURE: 96.8 F | DIASTOLIC BLOOD PRESSURE: 64 MMHG | OXYGEN SATURATION: 100 %

## 2021-04-01 DIAGNOSIS — Z87.19 HX OF ULCERATIVE COLITIS: ICD-10-CM

## 2021-04-01 DIAGNOSIS — R86.8 LOW VOLUME OF EJACULATED SEMEN: ICD-10-CM

## 2021-04-01 DIAGNOSIS — F41.1 GAD (GENERALIZED ANXIETY DISORDER): ICD-10-CM

## 2021-04-01 DIAGNOSIS — Z00.00 ROUTINE GENERAL MEDICAL EXAMINATION AT A HEALTH CARE FACILITY: Primary | ICD-10-CM

## 2021-04-01 DIAGNOSIS — Z86.59 HISTORY OF MAJOR DEPRESSION: ICD-10-CM

## 2021-04-01 PROCEDURE — 99395 PREV VISIT EST AGE 18-39: CPT | Performed by: NURSE PRACTITIONER

## 2021-04-01 PROCEDURE — 99214 OFFICE O/P EST MOD 30 MIN: CPT | Mod: 25 | Performed by: NURSE PRACTITIONER

## 2021-04-01 ASSESSMENT — MIFFLIN-ST. JEOR: SCORE: 1623.67

## 2021-04-17 ENCOUNTER — HEALTH MAINTENANCE LETTER (OUTPATIENT)
Age: 36
End: 2021-04-17

## 2021-06-16 ENCOUNTER — PRE VISIT (OUTPATIENT)
Dept: UROLOGY | Facility: CLINIC | Age: 36
End: 2021-06-16

## 2021-06-16 NOTE — TELEPHONE ENCOUNTER
Reason for Visit: Consult    Diagnosis: low semen volume    Orders/Procedures/Records: in system    Contact Patient: n/a    Rooming Requirements: normal      Magda Adkins LPN  06/16/21  10:07 AM

## 2021-06-18 ENCOUNTER — PRE VISIT (OUTPATIENT)
Dept: UROLOGY | Facility: CLINIC | Age: 36
End: 2021-06-18

## 2021-07-06 ENCOUNTER — TRANSFERRED RECORDS (OUTPATIENT)
Dept: HEALTH INFORMATION MANAGEMENT | Facility: CLINIC | Age: 36
End: 2021-07-06

## 2021-07-13 ENCOUNTER — VIRTUAL VISIT (OUTPATIENT)
Dept: UROLOGY | Facility: CLINIC | Age: 36
End: 2021-07-13
Payer: COMMERCIAL

## 2021-07-13 DIAGNOSIS — R86.8 LOW VOLUME OF EJACULATED SEMEN: Primary | ICD-10-CM

## 2021-07-13 DIAGNOSIS — R68.82 LOW LIBIDO: ICD-10-CM

## 2021-07-13 PROCEDURE — 99203 OFFICE O/P NEW LOW 30 MIN: CPT | Mod: 95 | Performed by: NURSE PRACTITIONER

## 2021-07-13 RX ORDER — CALCIUM CARBONATE 300MG(750)
TABLET,CHEWABLE ORAL
COMMUNITY

## 2021-07-13 RX ORDER — MULTIPLE VITAMINS W/ MINERALS TAB 9MG-400MCG
1 TAB ORAL DAILY
COMMUNITY

## 2021-07-13 ASSESSMENT — PAIN SCALES - GENERAL: PAINLEVEL: NO PAIN (0)

## 2021-07-13 NOTE — PATIENT INSTRUCTIONS
UROLOGY CLINIC VISIT PATIENT INSTRUCTIONS    -Will check hormone panel.  -Follow up with Dr. Giles via virtual visit to review lab results and complete exam.     If you have any issues, questions or concerns in the meantime, do not hesitate to contact us at [phone] or via [employer].     It was a pleasure meeting with you today.  Thank you for allowing me and my team the privilege of caring for you today.  YOU are the reason we are here, and I truly hope we provided you with the excellent service you deserve.  Please let us know if there is anything else we can do for you so that we can be sure you are leaving completely satisfied with your care experience.    Pamela Resendiz, CNP

## 2021-07-13 NOTE — LETTER
7/13/2021       RE: Sampson Sal  [address]   [facility] [address]     Dear Colleague,    Thank you for referring your patient, Sampson Sal, to the [facility] [place] at [facility]. Please see a copy of my visit note below.    Sampson is a 35 year old who is being evaluated via a billable video visit.      How would you like to obtain your AVS? MyChart  If the video visit is dropped, the invitation should be resent by: Text to cell phone: [phone]  Will anyone else be joining your video visit? No    Video Start Time: 2:33 PM  Video-Visit Details    Type of service:  Video Visit    Video End Time:3:01 PM    Originating Location (pt. Location): Home    Distant Location (provider location):  [facility] [place]     Platform used for Video Visit: ServiceFrame       Assessment/Plan:  35 year old male with a history of ANDRIA and depression who has been experiencing low libido, decreased sensation of orgasm, and reduced ejaculatory volume. No pain. No fertility concerns as he does not want more children. Biggest concern is the reduced sensation. Due to the nature of our visit today, I am unable to perform a physical exam.   -Will start with baseline hormone panel  -Patient to follow up for an in-person visit with Dr. Giles to complete exam +/- TRUS    Pamela Resendiz, CNP  Department of Urology      Subjective:   35 year old male with a history of ANDRIA and depression who presents via virtual for evaluation of decreased sensation of orgasm and reduced ejaculatory volume. Per chart review, had reported low libido back in 08/2019, with testosterone within normal range at that time. Also reported a left scrotal mass, but US was unremarkable.     Continues to notice a diminishing libido, as well as reduced volume of ejaculate and sensation. He questions if this is age-related. He has tried an OTC supplement called kratom, which  is typically taken for stimulant properties and resembles a mild opioid. He took this a few years ago in an effort to drink less alcohol, and he thinks this is when his libido and ejaculatory changes began to worsen, although they were already occurring prior to taking this to some degree.     His semen is less propelled and seems to just dribble out. Also has a low volume. He has one child and does not want more so there is no fertility concerns related to this change. His biggest concern is the loss of sensation with orgasm.     He still notices the aforementioned left-sided scrotal mass but does not believe that it has changed. No associated pain.       Objective:     Exam:   Patient is a 35 year old male   General Appearance: Well groomed, hygenic  Respiratory: No cough, no respiratory distress or labored breathing  Musculoskeletal: Grossly normal with no gross deficits  Skin: No discoloration or apparent rashes  Neurologic: No tremors  Psychiatric: Alert and oriented  Further examination is deferred due to the nature of our visit.

## 2021-07-13 NOTE — NURSING NOTE
Chief Complaint   Patient presents with     Consult     Low volume of ejaculant       There were no vitals taken for this visit. There is no height or weight on file to calculate BMI.    Patient Active Problem List   Diagnosis     CARDIOVASCULAR SCREENING; LDL GOAL LESS THAN 160     ANDRIA (generalized anxiety disorder)     Lack of libido     Episode of recurrent major depressive disorder, unspecified depression episode severity (H)       No Known Allergies    Current Outpatient Medications   Medication Sig Dispense Refill     Magnesium 400 MG TABS        multivitamin w/minerals (MULTI-VITAMIN) tablet Take 1 tablet by mouth daily         Social History     Tobacco Use     Smoking status: Never Smoker     Smokeless tobacco: Never Used   Substance Use Topics     Alcohol use: Yes     Drug use: No       Jazmin Pride CMA  7/13/2021  2:05 PM

## 2021-07-13 NOTE — PROGRESS NOTES
Sampson is a 35 year old who is being evaluated via a billable video visit.      How would you like to obtain your AVS? MyChart  If the video visit is dropped, the invitation should be resent by: Text to cell phone: [phone]  Will anyone else be joining your video visit? No    Video Start Time: 2:33 PM  Video-Visit Details    Type of service:  Video Visit    Video End Time:3:01 PM    Originating Location (pt. Location): Home    Distant Location (provider location):  [facility] [place]     Platform used for Video Visit: Adenios       Assessment/Plan:  35 year old male with a history of ANDRIA and depression who has been experiencing low libido, decreased sensation of orgasm, and reduced ejaculatory volume. No pain. No fertility concerns as he does not want more children. Biggest concern is the reduced sensation. Due to the nature of our visit today, I am unable to perform a physical exam.   -Will start with baseline hormone panel  -Patient to follow up for an in-person visit with Dr. Giles to complete exam +/- TRUS    Pamela Resendiz CNP  Department of Urology      Subjective:   35 year old male with a history of ANDRIA and depression who presents via virtual for evaluation of decreased sensation of orgasm and reduced ejaculatory volume. Per chart review, had reported low libido back in 08/2019, with testosterone within normal range at that time. Also reported a left scrotal mass, but US was unremarkable.     Continues to notice a diminishing libido, as well as reduced volume of ejaculate and sensation. He questions if this is age-related. He has tried an OTC supplement called kratom, which is typically taken for stimulant properties and resembles a mild opioid. He took this a few years ago in an effort to drink less alcohol, and he thinks this is when his libido and ejaculatory changes began to worsen, although they were already occurring prior to taking this to some degree.     His semen is  less propelled and seems to just dribble out. Also has a low volume. He has one child and does not want more so there is no fertility concerns related to this change. His biggest concern is the loss of sensation with orgasm.     He still notices the aforementioned left-sided scrotal mass but does not believe that it has changed. No associated pain.       Objective:     Exam:   Patient is a 35 year old male   General Appearance: Well groomed, hygenic  Respiratory: No cough, no respiratory distress or labored breathing  Musculoskeletal: Grossly normal with no gross deficits  Skin: No discoloration or apparent rashes  Neurologic: No tremors  Psychiatric: Alert and oriented  Further examination is deferred due to the nature of our visit.

## 2021-08-24 ENCOUNTER — LAB (OUTPATIENT)
Dept: LAB | Facility: CLINIC | Age: 36
End: 2021-08-24
Payer: COMMERCIAL

## 2021-08-24 DIAGNOSIS — R68.82 LOW LIBIDO: ICD-10-CM

## 2021-08-24 DIAGNOSIS — R86.8 LOW VOLUME OF EJACULATED SEMEN: ICD-10-CM

## 2021-08-24 LAB
SHBG SERPL-SCNC: 50 NMOL/L (ref 11–80)
TSH SERPL DL<=0.005 MIU/L-ACNC: 1 MU/L (ref 0.4–4)

## 2021-08-24 PROCEDURE — 84146 ASSAY OF PROLACTIN: CPT

## 2021-08-24 PROCEDURE — 36415 COLL VENOUS BLD VENIPUNCTURE: CPT

## 2021-08-24 PROCEDURE — 84270 ASSAY OF SEX HORMONE GLOBUL: CPT

## 2021-08-24 PROCEDURE — 83002 ASSAY OF GONADOTROPIN (LH): CPT

## 2021-08-24 PROCEDURE — 84443 ASSAY THYROID STIM HORMONE: CPT

## 2021-08-25 LAB
LH SERPL-ACNC: 6.5 IU/L (ref 1.5–9.3)
PROLACTIN SERPL-MCNC: 3 UG/L (ref 2–18)

## 2021-08-28 LAB
SHBG SERPL-SCNC: 50 NMOL/L (ref 11–80)
TESTOST FREE SERPL-MCNC: 7.01 NG/DL
TESTOST SERPL-MCNC: 446 NG/DL (ref 240–950)

## 2021-08-30 ENCOUNTER — PRE VISIT (OUTPATIENT)
Dept: UROLOGY | Facility: CLINIC | Age: 36
End: 2021-08-30

## 2021-08-30 NOTE — TELEPHONE ENCOUNTER
Reason for visit: Follow up     Relevant information: low volume of ejaculated semen; physical exam and discuss TRUS    Records/imaging/labs/orders: in EPIC    Pt called: no    At Rooming: normal

## 2021-09-09 ENCOUNTER — OFFICE VISIT (OUTPATIENT)
Dept: UROLOGY | Facility: CLINIC | Age: 36
End: 2021-09-09
Payer: COMMERCIAL

## 2021-09-09 VITALS
DIASTOLIC BLOOD PRESSURE: 74 MMHG | HEIGHT: 70 IN | WEIGHT: 150 LBS | BODY MASS INDEX: 21.47 KG/M2 | SYSTOLIC BLOOD PRESSURE: 127 MMHG | HEART RATE: 95 BPM

## 2021-09-09 DIAGNOSIS — R86.8 LOW VOLUME OF EJACULATED SEMEN: Primary | ICD-10-CM

## 2021-09-09 DIAGNOSIS — N48.9 PENILE DISORDER: ICD-10-CM

## 2021-09-09 PROCEDURE — 99214 OFFICE O/P EST MOD 30 MIN: CPT | Performed by: UROLOGY

## 2021-09-09 ASSESSMENT — PAIN SCALES - GENERAL: PAINLEVEL: NO PAIN (0)

## 2021-09-09 ASSESSMENT — MIFFLIN-ST. JEOR: SCORE: 1611.65

## 2021-09-09 NOTE — LETTER
"9/9/2021       RE: Sampson Sal  [address]   [facility] [address]     Dear Colleague,    Thank you for referring your patient, Sampson Sal, to the [facility]OLOG[facility] [place] at [facility]. Please see a copy of my visit note below.    HPI:  Sampson Sal is a 35 year old male being seen for follow-up sexual concerns.  He recently saw Valery Resendiz in this office ( virtual) 7/13/21.    Decreased sensation.  Less force of ejaculation, semen volume.  He'd taken Kratom in the past, wondering if this has negatively impacted him    Good urine stream.  No history of UTI or kidney stones.    Main complaint is less sensation to the glans. 30% of previous (compared to 3-4 years ago).    No vision changes.  No new or transient focal neurologic issues. No balance problems.  Has not consulted neurology.    Semen volume is dramatically low, he feels, just a few drops.    No prescription medications at this time.  No history of TURP or prostate surgery.  No history of vasectomy.    Exam:  Physical Exam  /74   Pulse 95   Ht 1.778 m (5' 10\")   Wt 68 kg (150 lb)   BMI 21.52 kg/m      General: Alert, oriented, nad.  Pleasant and conversant.  Eyes: anicteric, EOMI.  Pulse: regular  Resps: normal, non-labored.  Abdomen:  Nondistended.  Neurological - no tremors  Skin - no discoloration/ lesions noted   exam   Phallus circumcised  Testes ++, anodular, nontender. Each about 15cc.  Small testis appendage or tunical cyst on the left testis.  This was perhaps seen on 2019 scrotal ultrasound as a small cyst but not characterized  by radiologist .  Vas and epididymis present and normal bilaterally  Small g1-2 left  varicocele noted.  SHAUN deferred.     Review of Imaging:  The following imaging exams were independently viewed and interpreted by me and discussed with patient:  Scrotal ultrasound 8/22/19  IMPRESSION:   Normal testicular ultrasound. No masses " identified.    Review of Labs:  The following labs were reviewed by me and discussed with the patient:  Recent Results (from the past 720 hour(s))   TSH with free T4 reflex    Collection Time: 08/24/21 11:45 AM   Result Value Ref Range    TSH 1.00 0.40 - 4.00 mU/L   Prolactin    Collection Time: 08/24/21 11:45 AM   Result Value Ref Range    Prolactin 3 2 - 18 ug/L   Lutropin    Collection Time: 08/24/21 11:45 AM   Result Value Ref Range    Lutropin 6.5 1.5 - 9.3 IU/L   Sex Hormone Binding Globulin    Collection Time: 08/24/21 11:45 AM   Result Value Ref Range    Sex Hormone Binding Globulin 50 11 - 80 nmol/L   Testosterone Free and Total    Collection Time: 08/24/21 11:45 AM   Result Value Ref Range    Free Testosterone Calculated 7.01 ng/dL    Testosterone Total 446 240 - 950 ng/dL    Sex Hormone Binding Globulin 50 11 - 80 nmol/L     Component      Latest Ref Rng & Units 8/21/2019   Testosterone Total      240 - 950 ng/dL 503   Sex Hormone Binding Globulin      11 - 80 nmol/L 75   Free Testosterone Calculated      4.7 - 24.4 ng/dL 5.85         Assessment & Plan   1. Low volume of ejaculated semen. ( chronic issue)  a. Antegrade/retrograde semen analysis ordered  b. Consider trans-rectal ultrasound based on semen analysis results.  c. [facility] and decide on TRUS after that.  2. Neurology referral made for decreased penile sensation. ( chronic issue) , rule out neurologic disorder like MS.    Willis Giles MD  [facility] [place]      ==========================    Additional Coding Information:    Problems:  4 -- two or more stable chronic illnesses    Data Reviewed  Scrotal ultrasound and 10 blood labs reviewed, as listed above.      3 -- low risk (e.g., OTC medication or observation, minor surgery without risks)    Time spent:  35 minutes spent on the date of the encounter doing chart review, history and exam, documentation and further activities per the  note

## 2021-09-09 NOTE — PROGRESS NOTES
"HPI:  Samposn Sal is a 35 year old male being seen for follow-up sexual concerns.  He recently saw Valery Resendiz in this office ( virtual) 7/13/21.    Decreased sensation.  Less force of ejaculation, semen volume.  He'd taken Kratom in the past, wondering if this has negatively impacted him    Good urine stream.  No history of UTI or kidney stones.    Main complaint is less sensation to the glans. 30% of previous (compared to 3-4 years ago).    No vision changes.  No new or transient focal neurologic issues. No balance problems.  Has not consulted neurology.    Semen volume is dramatically low, he feels, just a few drops.    No prescription medications at this time.  No history of TURP or prostate surgery.  No history of vasectomy.    Exam:  Physical Exam  /74   Pulse 95   Ht 1.778 m (5' 10\")   Wt 68 kg (150 lb)   BMI 21.52 kg/m      General: Alert, oriented, nad.  Pleasant and conversant.  Eyes: anicteric, EOMI.  Pulse: regular  Resps: normal, non-labored.  Abdomen:  Nondistended.  Neurological - no tremors  Skin - no discoloration/ lesions noted   exam   Phallus circumcised  Testes ++, anodular, nontender. Each about 15cc.  Small testis appendage or tunical cyst on the left testis.  This was perhaps seen on 2019 scrotal ultrasound as a small cyst but not characterized  by radiologist .  Vas and epididymis present and normal bilaterally  Small g1-2 left  varicocele noted.  SHAUN deferred.     Review of Imaging:  The following imaging exams were independently viewed and interpreted by me and discussed with patient:  Scrotal ultrasound 8/22/19  IMPRESSION:   Normal testicular ultrasound. No masses identified.    Review of Labs:  The following labs were reviewed by me and discussed with the patient:  Recent Results (from the past 720 hour(s))   TSH with free T4 reflex    Collection Time: 08/24/21 11:45 AM   Result Value Ref Range    TSH 1.00 0.40 - 4.00 mU/L   Prolactin    Collection Time: 08/24/21 11:45 " AM   Result Value Ref Range    Prolactin 3 2 - 18 ug/L   Lutropin    Collection Time: 08/24/21 11:45 AM   Result Value Ref Range    Lutropin 6.5 1.5 - 9.3 IU/L   Sex Hormone Binding Globulin    Collection Time: 08/24/21 11:45 AM   Result Value Ref Range    Sex Hormone Binding Globulin 50 11 - 80 nmol/L   Testosterone Free and Total    Collection Time: 08/24/21 11:45 AM   Result Value Ref Range    Free Testosterone Calculated 7.01 ng/dL    Testosterone Total 446 240 - 950 ng/dL    Sex Hormone Binding Globulin 50 11 - 80 nmol/L     Component      Latest Ref Rng & Units 8/21/2019   Testosterone Total      240 - 950 ng/dL 503   Sex Hormone Binding Globulin      11 - 80 nmol/L 75   Free Testosterone Calculated      4.7 - 24.4 ng/dL 5.85         Assessment & Plan   1. Low volume of ejaculated semen. ( chronic issue)  a. Antegrade/retrograde semen analysis ordered  b. Consider trans-rectal ultrasound based on semen analysis results.  c. [facility] and [facility] after [facility].  2. Neurology referral made for decreased penile sensation. ( chronic issue) , rule out neurologic disorder like MS.    Willis Giles MD  [facility]OLOG[facility] [place]      ==========================    Additional Coding Information:    Problems:  4 -- two or more stable chronic illnesses    Data Reviewed  Scrotal ultrasound and 10 blood labs reviewed, as listed above.      3 -- low risk (e.g., OTC medication or observation, minor surgery without risks)    Time spent:  35 minutes spent on the date of the encounter doing chart review, history and exam, documentation and further activities per the note

## 2021-09-09 NOTE — PATIENT INSTRUCTIONS
You were given a neurology referral today. Please call and schedule an antegrade-retrograde semen analysis. Dr. Giles will contact you via [employer] with results and follow up plan.    It was a pleasure meeting with you today.  Thank you for allowing me and my team the privilege of caring for you today.  YOU are the reason we are here, and I truly hope we provided you with the excellent service you deserve.  Please let us know if there is anything else we can do for you so that we can be sure you are leaving completely satisfied with your care experience.

## 2021-09-09 NOTE — NURSING NOTE
"Chief Complaint   Patient presents with     Consult For     low volume of ejaculation       Blood pressure 127/74, pulse 95, height 1.778 m (5' 10\"), weight 68 kg (150 lb). Body mass index is 21.52 kg/m .    Patient Active Problem List   Diagnosis     CARDIOVASCULAR SCREENING; LDL GOAL LESS THAN 160     ANDRIA (generalized anxiety disorder)     Lack of libido     Episode of recurrent major depressive disorder, unspecified depression episode severity (H)       No Known Allergies    Current Outpatient Medications   Medication Sig Dispense Refill     Magnesium 400 MG TABS        multivitamin w/minerals (MULTI-VITAMIN) tablet Take 1 tablet by mouth daily         Social History     Tobacco Use     Smoking status: Never Smoker     Smokeless tobacco: Current User     Types: Chew   Substance Use Topics     Alcohol use: Yes     Drug use: No       Raisarohan Delaney  9/9/2021  1:11 PM  "

## 2021-10-02 ENCOUNTER — HEALTH MAINTENANCE LETTER (OUTPATIENT)
Age: 36
End: 2021-10-02

## 2021-12-12 NOTE — TELEPHONE ENCOUNTER
FUTURE VISIT INFORMATION      FUTURE VISIT INFORMATION:    Date: 1/6/2022    Time: 1pm    Location: [facility]  REFERRAL INFORMATION:    Referring provider:  Dr. Cano    Referring providers clinic:  [facility]     Reason for visit/diagnosis  Low Volume of Ejaculated Semen     RECORDS REQUESTED FROM:       Clinic name Comments Records Status Imaging Status   Internal Dr. Giles-9/9/2021 [facility] N/A

## 2022-01-06 ENCOUNTER — PRE VISIT (OUTPATIENT)
Dept: NEUROLOGY | Facility: CLINIC | Age: 37
End: 2022-01-06

## 2022-01-06 ENCOUNTER — OFFICE VISIT (OUTPATIENT)
Dept: NEUROLOGY | Facility: CLINIC | Age: 37
End: 2022-01-06
Attending: UROLOGY
Payer: COMMERCIAL

## 2022-01-06 VITALS
DIASTOLIC BLOOD PRESSURE: 81 MMHG | RESPIRATION RATE: 16 BRPM | HEART RATE: 78 BPM | OXYGEN SATURATION: 100 % | SYSTOLIC BLOOD PRESSURE: 149 MMHG

## 2022-01-06 DIAGNOSIS — R86.8 LOW VOLUME OF EJACULATED SEMEN: ICD-10-CM

## 2022-01-06 DIAGNOSIS — N48.9 PENILE DISORDER: ICD-10-CM

## 2022-01-06 PROCEDURE — 99203 OFFICE O/P NEW LOW 30 MIN: CPT | Mod: GC | Performed by: STUDENT IN AN ORGANIZED HEALTH CARE EDUCATION/TRAINING PROGRAM

## 2022-01-06 ASSESSMENT — PAIN SCALES - GENERAL: PAINLEVEL: NO PAIN (0)

## 2022-01-06 NOTE — LETTER
2022       RE: Sampson Sal  [address]   [facility] [address]     Dear Colleague,    Thank you for referring your patient, Sampson Sal, to the [facility] NEUROLOGY [facility]. Please see a copy of my visit note below.     DEPARTMENT OF NEUROLOGY  Referral for: low volume of ejaculation  Patient Name:  Sampson Sal  MRN:  [id number]    :  1985  Date of Clinic Visit:  2022  Primary Care Provider:  No Ref-Primary, Physician  Referring Provider: Dr. Giles    CHIEF COMPLAINT: low ejaculation volume and decreased sensation during sexual intercourse    HISTORY OF PRESENT ILLNESS:  Sampson Sal is a  36 year old male with PMH significant for anxiety and depression presenting with chief complaint of low ejaculation volume and decreased sensation during sexual intercourse.    Patient reports that he first noticed symptoms in 2019. They were noted to be lower volume of semen or ejaculation as well as decreased sensation or satisfaction when leading up to an orgasm. Patient noted that be feels as though the volume started to decreased in late 20's to early 30's. He is unsure of a specific event during this time that may have lead to this change. Regarding sensory changes, patient notes that during sexual contacts he has decreased satisfaction and sensation in his penis. This started in 2019 after he started taking a supplement called Kratom (herbal extract from evergreen leaves believed to act on opioid receptors). Denies any difficulty having or maintaining an erection. No difficulty with urinating or bowel movements. Reports full sensation in his bottom when he wipes with toilet paper. Also denies any lower extremity weakness or sensory changes. No upper extremity symptoms. No changes in vision, headaches, dizziness. No back pain or back injuries. Denies radicular symptoms.     Patient does note that in addition to  supplement mentioned above, in 2019 he had increase in stress at work and new born.     Of note, patient has been evaluated by PCP and Urology for the above issue as well. He has had serums studies including TSH, prolactin, lutropin, sex hormone binding globulin, testoerone all of which were within normal limits. Also had a testicular ultrasound completed that was normal. Further work up recommended was antegrade/retrograde semen analysis +/- trans-rectal ultrasound, which has not been completed yet.     ASSESSMENT AND PLAN:  Sampson Sal is a 36 year old male with PMH anxiety and depression who presented as referral for low volume of ejaculation and sensory changes in pelvis. Neurologic exam today is normal, including sensation and reflexes in lower extremities. I suspect that underlying etiology is most likely urologic in nature. I have a low suspicion for an underlying neurologic process to explain patients symptoms. He has no neurologic deficits on exam, or history of other neurologic symptoms. It would be unlikely for a process such as MS to cause isolated S2-3 sensory changes bilaterally. However, if patient were interested in pursuing further imaging would recommend an MRI to evaluate the lumbosacral plexus. This would likely be low yield given otherwise normal exam, but would help to rule out any structural process. Recommend follow up with Urology to complete Dr. Giles's recommended work up. If patient elects to have MRI then can follow up with me after to discuss results.    Plan:  - Follow up with Urologist to complete semen analysis test.  - Would recommend MRI lumbar spine to evaluate lumbosacral plexus if further imaging is desired by patient, although suspect this would be low yield with otherwise normal neurologic exam.  - Follow up with neurology PRN if new or worsening symptoms, or if MRI is completed.    Patient was seen and discussed with Dr. Mansfield.    Mckenna Norris MD  Neurology  Resident, PGY-2  [facility] Department of Neurology      PHYSICAL EXAMINATION:  Vitals: BP (!) 149/81   Pulse 78   Resp 16   SpO2 100%     General: no acute distress, appears stated age  Cardiac: regular rate  Respiratory: no increased work of breathing  : exam deferred per patient preference, per Urology note on 9/9/21 with small testis appendage on left testis, small left varicocele, otherwise normal exam.    Neurologic Exam:  Mental status: Patient is oriented to person, place and time and able to provide a detailed account of history of illness. Speech is fluent; comprehension, repetition and naming are normal.    Cranial nerves: Pupils are round and react normally to light and accommodation. Visual fields are full and extraocular movements are normal. Facial strength and sensation are normal. Hearing is normal to conversation. Palate rises symmetrically and there is no dysarthria. Tongue protrusion is normal.    Motor/Strength: Strength is 5/5 throughout proximal and distal muscles of upper and lower extremities. No tremors, myoclonus, or other abnormal movements. Muscle bulk and tone are normal.     Sensation: Sensation is intact to pin-prick and vibration in all four extremities. Also intact to light touch throughout upper and lower extremities.      Reflexes: Biceps, brachioradialis, triceps, patellae, and ankle jerks 2+ bilaterally. Plantar responses are flexor bilaterally. No clonus at ankles.    Coordination: Finger-to-nose symmetric and normal bilaterally. Rapid alternating movements are symmetric in the extremities. No pronator drift.    Gait: Gait is narrow based and steady and the patient is able to walk on heels, toes and in tandem. Romberg is negative.       INVESTIGATIONS:   US TESTICULAR AND SCROTUM WITH DOPPLER LIMITED  8/22/2019 8:22 AM    IMPRESSION:   Normal testicular ultrasound. No masses identified.    TSH- 1.00  Prolactin- 3  Lutropin- 6.5  Testosterone total-  446  Testosterone free- 7.01  Sex hormone binding globulin- 50    REVIEW OF SYSTEMS: 12-point RoS negative except as per HPI.    ALLERGIES:  No Known Allergies  MEDICATIONS:  Current Outpatient Medications   Medication Sig Dispense Refill     Magnesium 400 MG TABS        multivitamin w/minerals (MULTI-VITAMIN) tablet Take 1 tablet by mouth daily       PAST MEDICAL HISTORY:  Anxiety  Depression    PAST SURGICAL HISTORY:  Past Surgical History:   Procedure Laterality Date     none       SOCIAL HISTORY:  Socioeconomic History     Marital status:    Tobacco Use     Smoking status: Never Smoker     Smokeless tobacco: Current User     Types: Chew   Substance and Sexual Activity     Alcohol use: Yes     Drug use: No     Sexual activity: Yes     Partners: Female     Birth control/protection: Condom     FAMILY HISTORY:  Family History   Problem Relation Age of Onset     GI problems Mother      Breast Cancer Mother      Diabetes Father      Obesity Father      Mental Illness Sister      Cerebrovascular Disease Maternal Grandmother      Colon Polyps Maternal Grandfather            Attestation signed by Stone Mansfield MD at 1/10/2022  8:22 AM:  Attending Attestation    I saw and evaluated the patient on 1/6/22 and agree with the findings and the plan of care as documented in the resident's note.           Aries Mansfield MD   of Neurology  [facility]        Mckenna Norris MD

## 2022-01-06 NOTE — NURSING NOTE
Chief Complaint   Patient presents with     Consult     ump new- low volume of ejaculated penile disorder- lack of penile and sexual sensation     Caroline Lee

## 2022-01-06 NOTE — PROGRESS NOTES
DEPARTMENT OF NEUROLOGY  Referral for: low volume of ejaculation  Patient Name:  Sampson Sal  MRN:  [id number]    :  1985  Date of Clinic Visit:  2022  Primary Care Provider:  No Ref-Primary, Physician  Referring Provider: Dr. Giles    CHIEF COMPLAINT: low ejaculation volume and decreased sensation during sexual intercourse    HISTORY OF PRESENT ILLNESS:  Sampson Sal is a  36 year old male with PMH significant for anxiety and depression presenting with chief complaint of low ejaculation volume and decreased sensation during sexual intercourse.    Patient reports that he first noticed symptoms in 2019. They were noted to be lower volume of semen or ejaculation as well as decreased sensation or satisfaction when leading up to an orgasm. Patient noted that be feels as though the volume started to decreased in late 20's to early 30's. He is unsure of a specific event during this time that may have lead to this change. Regarding sensory changes, patient notes that during sexual contacts he has decreased satisfaction and sensation in his penis. This started in 2019 after he started taking a supplement called Kratom (herbal extract from evergreen leaves believed to act on opioid receptors). Denies any difficulty having or maintaining an erection. No difficulty with urinating or bowel movements. Reports full sensation in his bottom when he wipes with toilet paper. Also denies any lower extremity weakness or sensory changes. No upper extremity symptoms. No changes in vision, headaches, dizziness. No back pain or back injuries. Denies radicular symptoms.     Patient does note that in addition to supplement mentioned above, in 2019 he had increase in stress at work and new born.     Of note, patient has been evaluated by PCP and Urology for the above issue as well. He has had serums studies including TSH, prolactin, lutropin, sex hormone binding globulin, testoerone all of which were within normal  limits. Also had a testicular ultrasound completed that was normal. Further work up recommended was antegrade/retrograde semen analysis +/- trans-rectal ultrasound, which has not been completed yet.     ASSESSMENT AND PLAN:  Sampson Sal is a 36 year old male with PMH anxiety and depression who presented as referral for low volume of ejaculation and sensory changes in pelvis. Neurologic exam today is normal, including sensation and reflexes in lower extremities. I suspect that underlying etiology is most likely urologic in nature. I have a low suspicion for an underlying neurologic process to explain patients symptoms. He has no neurologic deficits on exam, or history of other neurologic symptoms. It would be unlikely for a process such as MS to cause isolated S2-3 sensory changes bilaterally. However, if patient were interested in pursuing further imaging would recommend an MRI to evaluate the lumbosacral plexus. This would likely be low yield given otherwise normal exam, but would help to rule out any structural process. Recommend follow up with Urology to complete Dr. Giles's recommended work up. If patient elects to have MRI then can follow up with me after to discuss results.    Plan:  - Follow up with Urologist to complete semen analysis test.  - Would recommend MRI lumbar spine to evaluate lumbosacral plexus if further imaging is desired by patient, although suspect this would be low yield with otherwise normal neurologic exam.  - Follow up with neurology PRN if new or worsening symptoms, or if MRI is completed.    Patient was seen and discussed with Dr. Mansfield.    Mckenna Norris MD  Neurology Resident, PGY-2  [facility] Department of Neurology      PHYSICAL EXAMINATION:  Vitals: BP (!) 149/81   Pulse 78   Resp 16   SpO2 100%     General: no acute distress, appears stated age  Cardiac: regular rate  Respiratory: no increased work of breathing  : exam deferred per patient preference,  per Urology note on 9/9/21 with small testis appendage on left testis, small left varicocele, otherwise normal exam.    Neurologic Exam:  Mental status: Patient is oriented to person, place and time and able to provide a detailed account of history of illness. Speech is fluent; comprehension, repetition and naming are normal.    Cranial nerves: Pupils are round and react normally to light and accommodation. Visual fields are full and extraocular movements are normal. Facial strength and sensation are normal. Hearing is normal to conversation. Palate rises symmetrically and there is no dysarthria. Tongue protrusion is normal.    Motor/Strength: Strength is 5/5 throughout proximal and distal muscles of upper and lower extremities. No tremors, myoclonus, or other abnormal movements. Muscle bulk and tone are normal.     Sensation: Sensation is intact to pin-prick and vibration in all four extremities. Also intact to light touch throughout upper and lower extremities.      Reflexes: Biceps, brachioradialis, triceps, patellae, and ankle jerks 2+ bilaterally. Plantar responses are flexor bilaterally. No clonus at ankles.    Coordination: Finger-to-nose symmetric and normal bilaterally. Rapid alternating movements are symmetric in the extremities. No pronator drift.    Gait: Gait is narrow based and steady and the patient is able to walk on heels, toes and in tandem. Romberg is negative.       INVESTIGATIONS:   US TESTICULAR AND SCROTUM WITH DOPPLER LIMITED  8/22/2019 8:22 AM    IMPRESSION:   Normal testicular ultrasound. No masses identified.    TSH- 1.00  Prolactin- 3  Lutropin- 6.5  Testosterone total- 446  Testosterone free- 7.01  Sex hormone binding globulin- 50    REVIEW OF SYSTEMS: 12-point RoS negative except as per HPI.    ALLERGIES:  No Known Allergies  MEDICATIONS:  Current Outpatient Medications   Medication Sig Dispense Refill     Magnesium 400 MG TABS        multivitamin w/minerals (MULTI-VITAMIN) tablet Take  1 tablet by mouth daily       PAST MEDICAL HISTORY:  Anxiety  Depression    PAST SURGICAL HISTORY:  Past Surgical History:   Procedure Laterality Date     none       SOCIAL HISTORY:  Socioeconomic History     Marital status:    Tobacco Use     Smoking status: Never Smoker     Smokeless tobacco: Current User     Types: Chew   Substance and Sexual Activity     Alcohol use: Yes     Drug use: No     Sexual activity: Yes     Partners: Female     Birth control/protection: Condom     FAMILY HISTORY:  Family History   Problem Relation Age of Onset     GI problems Mother      Breast Cancer Mother      Diabetes Father      Obesity Father      Mental Illness Sister      Cerebrovascular Disease Maternal Grandmother      Colon Polyps Maternal Grandfather

## 2022-05-14 ENCOUNTER — HEALTH MAINTENANCE LETTER (OUTPATIENT)
Age: 37
End: 2022-05-14

## 2023-01-14 ENCOUNTER — HEALTH MAINTENANCE LETTER (OUTPATIENT)
Age: 38
End: 2023-01-14

## 2023-04-10 ASSESSMENT — ENCOUNTER SYMPTOMS
HEARTBURN: 0
FREQUENCY: 0
CONSTIPATION: 0
WEAKNESS: 0
SHORTNESS OF BREATH: 0
DIZZINESS: 0
ABDOMINAL PAIN: 0
JOINT SWELLING: 0
DIARRHEA: 0
COUGH: 0
HEADACHES: 0
DYSURIA: 0
CHILLS: 0
EYE PAIN: 0
HEMATOCHEZIA: 0
SORE THROAT: 0
NERVOUS/ANXIOUS: 0
NAUSEA: 0
MYALGIAS: 0
ARTHRALGIAS: 0
PALPITATIONS: 0
PARESTHESIAS: 0
HEMATURIA: 0
FEVER: 0

## 2023-04-11 ENCOUNTER — OFFICE VISIT (OUTPATIENT)
Dept: FAMILY MEDICINE | Facility: CLINIC | Age: 38
End: 2023-04-11
Payer: COMMERCIAL

## 2023-04-11 VITALS
HEART RATE: 66 BPM | TEMPERATURE: 97.3 F | RESPIRATION RATE: 16 BRPM | BODY MASS INDEX: 21.59 KG/M2 | HEIGHT: 69 IN | DIASTOLIC BLOOD PRESSURE: 80 MMHG | OXYGEN SATURATION: 100 % | SYSTOLIC BLOOD PRESSURE: 118 MMHG | WEIGHT: 145.8 LBS

## 2023-04-11 DIAGNOSIS — Z00.00 ROUTINE GENERAL MEDICAL EXAMINATION AT A HEALTH CARE FACILITY: Primary | ICD-10-CM

## 2023-04-11 DIAGNOSIS — Z11.59 NEED FOR HEPATITIS C SCREENING TEST: ICD-10-CM

## 2023-04-11 DIAGNOSIS — K21.9 GASTROESOPHAGEAL REFLUX DISEASE, UNSPECIFIED WHETHER ESOPHAGITIS PRESENT: ICD-10-CM

## 2023-04-11 DIAGNOSIS — K29.20 ALCOHOLIC GASTRITIS WITHOUT BLEEDING, UNSPECIFIED CHRONICITY: ICD-10-CM

## 2023-04-11 DIAGNOSIS — Z11.4 SCREENING FOR HIV (HUMAN IMMUNODEFICIENCY VIRUS): ICD-10-CM

## 2023-04-11 DIAGNOSIS — R10.11 RUQ ABDOMINAL PAIN: ICD-10-CM

## 2023-04-11 DIAGNOSIS — Z13.6 CARDIOVASCULAR SCREENING; LDL GOAL LESS THAN 160: ICD-10-CM

## 2023-04-11 DIAGNOSIS — R63.4 UNINTENTIONAL WEIGHT LOSS: ICD-10-CM

## 2023-04-11 LAB
ALBUMIN SERPL BCG-MCNC: 4.9 G/DL (ref 3.5–5.2)
ALP SERPL-CCNC: 63 U/L (ref 40–129)
ALT SERPL W P-5'-P-CCNC: 17 U/L (ref 10–50)
ANION GAP SERPL CALCULATED.3IONS-SCNC: 13 MMOL/L (ref 7–15)
AST SERPL W P-5'-P-CCNC: 26 U/L (ref 10–50)
BASOPHILS # BLD AUTO: 0.1 10E3/UL (ref 0–0.2)
BASOPHILS NFR BLD AUTO: 2 %
BILIRUB SERPL-MCNC: 0.5 MG/DL
BUN SERPL-MCNC: 11.6 MG/DL (ref 6–20)
CALCIUM SERPL-MCNC: 10.2 MG/DL (ref 8.6–10)
CHLORIDE SERPL-SCNC: 103 MMOL/L (ref 98–107)
CHOLEST SERPL-MCNC: 160 MG/DL
CREAT SERPL-MCNC: 0.92 MG/DL (ref 0.67–1.17)
DEPRECATED HCO3 PLAS-SCNC: 25 MMOL/L (ref 22–29)
EOSINOPHIL # BLD AUTO: 0.1 10E3/UL (ref 0–0.7)
EOSINOPHIL NFR BLD AUTO: 4 %
ERYTHROCYTE [DISTWIDTH] IN BLOOD BY AUTOMATED COUNT: 13.5 % (ref 10–15)
GFR SERPL CREATININE-BSD FRML MDRD: >90 ML/MIN/1.73M2
GLUCOSE SERPL-MCNC: 93 MG/DL (ref 70–99)
HCT VFR BLD AUTO: 40.8 % (ref 40–53)
HDLC SERPL-MCNC: 64 MG/DL
HGB BLD-MCNC: 14.1 G/DL (ref 13.3–17.7)
LDLC SERPL CALC-MCNC: 88 MG/DL
LYMPHOCYTES # BLD AUTO: 1.6 10E3/UL (ref 0.8–5.3)
LYMPHOCYTES NFR BLD AUTO: 41 %
MCH RBC QN AUTO: 30 PG (ref 26.5–33)
MCHC RBC AUTO-ENTMCNC: 34.6 G/DL (ref 31.5–36.5)
MCV RBC AUTO: 87 FL (ref 78–100)
MONOCYTES # BLD AUTO: 0.4 10E3/UL (ref 0–1.3)
MONOCYTES NFR BLD AUTO: 11 %
NEUTROPHILS # BLD AUTO: 1.7 10E3/UL (ref 1.6–8.3)
NEUTROPHILS NFR BLD AUTO: 43 %
NONHDLC SERPL-MCNC: 96 MG/DL
PLATELET # BLD AUTO: 259 10E3/UL (ref 150–450)
POTASSIUM SERPL-SCNC: 4.8 MMOL/L (ref 3.4–5.3)
PROT SERPL-MCNC: 7.5 G/DL (ref 6.4–8.3)
RBC # BLD AUTO: 4.7 10E6/UL (ref 4.4–5.9)
SODIUM SERPL-SCNC: 141 MMOL/L (ref 136–145)
TRIGL SERPL-MCNC: 39 MG/DL
TSH SERPL DL<=0.005 MIU/L-ACNC: 1.05 UIU/ML (ref 0.3–4.2)
WBC # BLD AUTO: 4 10E3/UL (ref 4–11)

## 2023-04-11 PROCEDURE — 80050 GENERAL HEALTH PANEL: CPT | Performed by: INTERNAL MEDICINE

## 2023-04-11 PROCEDURE — 80061 LIPID PANEL: CPT | Performed by: INTERNAL MEDICINE

## 2023-04-11 PROCEDURE — 87389 HIV-1 AG W/HIV-1&-2 AB AG IA: CPT | Performed by: INTERNAL MEDICINE

## 2023-04-11 PROCEDURE — 99395 PREV VISIT EST AGE 18-39: CPT | Performed by: INTERNAL MEDICINE

## 2023-04-11 PROCEDURE — 86803 HEPATITIS C AB TEST: CPT | Performed by: INTERNAL MEDICINE

## 2023-04-11 PROCEDURE — 99214 OFFICE O/P EST MOD 30 MIN: CPT | Mod: 25 | Performed by: INTERNAL MEDICINE

## 2023-04-11 PROCEDURE — 36415 COLL VENOUS BLD VENIPUNCTURE: CPT | Performed by: INTERNAL MEDICINE

## 2023-04-11 ASSESSMENT — ENCOUNTER SYMPTOMS
HEMATURIA: 0
HEMATOCHEZIA: 0
NAUSEA: 0
FEVER: 0
NERVOUS/ANXIOUS: 0
JOINT SWELLING: 0
ARTHRALGIAS: 0
HEARTBURN: 0
PALPITATIONS: 0
COUGH: 0
CHILLS: 0
FREQUENCY: 0
HEADACHES: 0
WEAKNESS: 0
DIZZINESS: 0
CONSTIPATION: 0
DYSURIA: 0
ABDOMINAL PAIN: 0
EYE PAIN: 0
MYALGIAS: 0
PARESTHESIAS: 0
DIARRHEA: 0
SORE THROAT: 0
SHORTNESS OF BREATH: 0

## 2023-04-11 ASSESSMENT — PATIENT HEALTH QUESTIONNAIRE - PHQ9
SUM OF ALL RESPONSES TO PHQ QUESTIONS 1-9: 0
SUM OF ALL RESPONSES TO PHQ QUESTIONS 1-9: 0

## 2023-04-11 ASSESSMENT — PAIN SCALES - GENERAL: PAINLEVEL: NO PAIN (0)

## 2023-04-11 NOTE — PROGRESS NOTES
SUBJECTIVE:   CC: Sampson is an 37 year old who presents for preventative health visit.          View : No data to display.              Patient has been advised of split billing requirements and indicates understanding: Yes  Healthy Habits:     Getting at least 3 servings of Calcium per day:  Yes    Bi-annual eye exam:  NO    Dental care twice a year:  Yes    Sleep apnea or symptoms of sleep apnea:  Excessive snoring    Diet:  Regular (no restrictions)    Frequency of exercise:  None    PHQ-2 Total Score: 0    Additional concerns today:  Yes    Today's PHQ-2 Score:       4/10/2023     7:55 PM   PHQ-2 ( 1999 [employer])   Q1: Little interest or pleasure in doing things 0   Q2: Feeling down, depressed or hopeless 0   PHQ-2 Score 0   Q1: Little interest or pleasure in doing things Not at all   Q2: Feeling down, depressed or hopeless Not at all   PHQ-2 Score 0           Social History     Tobacco Use     Smoking status: Never     Smokeless tobacco: Current     Types: Chew   Vaping Use     Vaping status: Not on file   Substance Use Topics     Alcohol use: Yes     Comment: Pt states that he consumes 3 drinks on Friday & Saturday             4/10/2023     7:55 PM   Alcohol Use   Prescreen: >3 drinks/day or >7 drinks/week? No          View : No data to display.                Last PSA: No results found for: PSA    Reviewed orders with patient. Reviewed health maintenance and updated orders accordingly - Yes  Lab work is in process  Labs reviewed in EPIC    Reviewed and updated as needed this visit by clinical staff   Tobacco  Allergies  Meds  Problems  Med Hx  Surg Hx  Fam Hx          Reviewed and updated as needed this visit by Provider   Tobacco  Allergies  Meds  Problems  Med Hx  Surg Hx  Fam Hx         Past Medical History:   Diagnosis Date     NO ACTIVE PROBLEMS       Past Surgical History:   Procedure Laterality Date     none         Review of Systems   Constitutional: Negative for chills and fever.   HENT:  "Negative for congestion, ear pain, hearing loss and sore throat.    Eyes: Negative for pain and visual disturbance.   Respiratory: Negative for cough and shortness of breath.    Cardiovascular: Negative for chest pain, palpitations and peripheral edema.   Gastrointestinal: Negative for abdominal pain, constipation, diarrhea, heartburn, hematochezia and nausea.   Genitourinary: Negative for dysuria, frequency, genital sores, hematuria, impotence, penile discharge and urgency.   Musculoskeletal: Negative for arthralgias, joint swelling and myalgias.   Skin: Negative for rash.   Neurological: Negative for dizziness, weakness, headaches and paresthesias.   Psychiatric/Behavioral: Negative for mood changes. The patient is not nervous/anxious.      CONSTITUTIONAL: NEGATIVE for fever, chills, change in weight  INTEGUMENTARY/SKIN: NEGATIVE for worrisome rashes, moles or lesions  EYES: NEGATIVE for vision changes or irritation  ENT: NEGATIVE for ear, mouth and throat problems  RESP: NEGATIVE for significant cough or SOB  CV: NEGATIVE for chest pain, palpitations or peripheral edema  GI: NEGATIVE for nausea, abdominal pain, heartburn, or change in bowel habits   male: negative for dysuria, hematuria, decreased urinary stream, erectile dysfunction, urethral discharge  MUSCULOSKELETAL: NEGATIVE for significant arthralgias or myalgia  NEURO: NEGATIVE for weakness, dizziness or paresthesias  PSYCHIATRIC: NEGATIVE for changes in mood or affect    OBJECTIVE:   /80   Pulse 66   Temp 97.3  F (36.3  C) (Tympanic)   Resp 16   Ht 1.757 m (5' 9.17\")   Wt 66.1 kg (145 lb 12.8 oz)   SpO2 100%   BMI 21.42 kg/m      Physical Exam  GENERAL: healthy, alert and no distress  EYES: Eyes grossly normal to inspection, PERRL and conjunctivae and sclerae normal  HENT: ear canals and TM's normal, nose and mouth without ulcers or lesions  NECK: no adenopathy, no asymmetry, masses, or scars and thyroid normal to palpation  RESP: lungs " clear to auscultation - no rales, rhonchi or wheezes  CV: regular rate and rhythm, normal S1 S2, no S3 or S4, no murmur, click or rub, no peripheral edema and peripheral pulses strong  ABDOMEN: soft, nontender, no hepatosplenomegaly, no masses and bowel sounds normal  MS: no gross musculoskeletal defects noted, no edema  SKIN: no suspicious lesions or rashes  NEURO: Normal strength and tone, mentation intact and speech normal  PSYCH: mentation appears normal, affect normal/bright    Diagnostic Test Results:  Labs reviewed in Epic    ASSESSMENT/PLAN:       Assessment and Plan  1. Routine general medical examination at a health care facility  Pt is new to  , he is here for Annual physical, . Does have concerns of weight loss and GERD as mentioned below.   - REVIEW OF HEALTH MAINTENANCE PROTOCOL ORDERS  - HIV Antigen Antibody Combo; Future  - Hepatitis C Screen Reflex to HCV RNA Quant and Genotype; Future  - Lipid panel reflex to direct LDL Non-fasting; Future  - TSH with free T4 reflex; Future  - Comprehensive metabolic panel (BMP + Alb, Alk Phos, ALT, AST, Total. Bili, TP); Future  - CBC with platelets and differential; Future  - HIV Antigen Antibody Combo  - Hepatitis C Screen Reflex to HCV RNA Quant and Genotype  - Lipid panel reflex to direct LDL Non-fasting  - TSH with free T4 reflex  - Comprehensive metabolic panel (BMP + Alb, Alk Phos, ALT, AST, Total. Bili, TP)  - CBC with platelets and differential    2. Unintentional weight loss  New problem, Pt states that he lost 6 lbs since 2/2023 and has been concerned. Denies any other symptoms on ROS questioning. Will check pertinent work up and do further recommendations.   - TSH with free T4 reflex; Future  - CBC with platelets and differential; Future  - TSH with free T4 reflex  - CBC with platelets and differential    3. Gastroesophageal reflux disease, unspecified whether esophagitis present  4. 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  RUQ abdominal pain  New problem, with intermittent flare up of GERD symptoms which he notices with consumption of alcohol and relieves with Omeprazole though the abdominal pain he depicted which happened 6 weeks back was on the Rt upper abdomen with no signs at this time.   - Helicobacter pylori Antigen Stool; Future  - Helicobacter pylori Antigen Stool  - Comprehensive metabolic panel (BMP + Alb, Alk Phos, ALT, AST, Total. Bili, TP); Future  - Comprehensive metabolic panel (BMP + Alb, Alk Phos, ALT, AST, Total. Bili, TP)    6. Screening for HIV (human immunodeficiency virus)  - HIV Antigen Antibody Combo; Future  - HIV Antigen Antibody Combo    7. Need for hepatitis C screening test  - Hepatitis C Screen Reflex to HCV RNA Quant and Genotype; Future  - Hepatitis C Screen Reflex to HCV RNA Quant and Genotype    8. CARDIOVASCULAR SCREENING; LDL GOAL LESS THAN 160  - Lipid panel reflex to direct LDL Non-fasting; Future  - Lipid panel reflex to direct LDL Non-fasting           Patient Instructions   As discussed , please do fasting labs as ordered.     We will do pertinent work-up for your weight loss and do further recommendations    Placed orders for H.pylori to make sure you do not have it . And treat if positive.     ============================    Preventive Health Recommendations  Male Ages 26 - 39    Yearly exam:             See your health care provider every year in order to  o   Review health changes.   o   Discuss preventive care.    o   Review your medicines if your doctor has prescribed any.    You should be tested each year for STDs (sexually transmitted diseases), if you re at risk.     After age 35, talk to your provider about cholesterol testing. If you are at risk for heart disease, have your cholesterol tested at least every 5 years.     If you are at risk for diabetes, you should have a diabetes test (fasting glucose).  Shots: Get a flu shot each year. Get a tetanus shot every 10 years.      Nutrition:    Eat at least 5 servings of fruits and vegetables daily.     Eat whole-grain bread, whole-wheat pasta and brown rice instead of white grains and rice.     Get adequate Calcium and Vitamin D.     Lifestyle    Exercise for at least 150 minutes a week (30 minutes a day, 5 days a week). This will help you control your weight and prevent disease.     Limit alcohol to one drink per day.     No smoking.     Wear sunscreen to prevent skin cancer.     See your dentist every six months for an exam and cleaning.       Return in about 3 months (around 2023), or if symptoms worsen or fail to improve, for Follow up of last visit, If symptoms persist - unintentional weight loss.    Corine Tomas MD  [facility]      Patient has been advised of split billing requirements and indicates understanding: Yes      COUNSELING:   Reviewed preventive health counseling, as reflected in patient instructions  Special attention given to:        Regular exercise       Healthy diet/nutrition       Vision screening       Hearing screening       Immunizations    Declined: Hepatitis B due to Other Pt would like to consider on some other day.                Alcohol Use        Consider Hep C screening for all patients one time for ages 18-79 years       HIV screeninx in teen years, 1x in adult years, and at intervals if high risk        He reports that he has never smoked. His smokeless tobacco use includes chew.            Corine Tomas MD  [facility] [place]  Answers for HPI/ROS submitted by the patient on 2023  PHQ9 TOTAL SCORE: 0

## 2023-04-11 NOTE — PATIENT INSTRUCTIONS
As discussed , please do fasting labs as ordered.     We will do pertinent work-up for your weight loss and do further recommendations    Placed orders for H.pylori to make sure you do not have it . And treat if positive.     ============================    Preventive Health Recommendations  Male Ages 26 - 39    Yearly exam:             See your health care provider every year in order to  o   Review health changes.   o   Discuss preventive care.    o   Review your medicines if your doctor has prescribed any.  You should be tested each year for STDs (sexually transmitted diseases), if you re at risk.   After age 35, talk to your provider about cholesterol testing. If you are at risk for heart disease, have your cholesterol tested at least every 5 years.   If you are at risk for diabetes, you should have a diabetes test (fasting glucose).  Shots: Get a flu shot each year. Get a tetanus shot every 10 years.     Nutrition:  Eat at least 5 servings of fruits and vegetables daily.   Eat whole-grain bread, whole-wheat pasta and brown rice instead of white grains and rice.   Get adequate Calcium and Vitamin D.     Lifestyle  Exercise for at least 150 minutes a week (30 minutes a day, 5 days a week). This will help you control your weight and prevent disease.   Limit alcohol to one drink per day.   No smoking.   Wear sunscreen to prevent skin cancer.   See your dentist every six months for an exam and cleaning.

## 2023-04-12 LAB
HCV AB SERPL QL IA: NONREACTIVE
HIV 1+2 AB+HIV1 P24 AG SERPL QL IA: NONREACTIVE

## 2024-02-15 ENCOUNTER — MYC MEDICAL ADVICE (OUTPATIENT)
Dept: FAMILY MEDICINE | Facility: CLINIC | Age: 39
End: 2024-02-15
Payer: COMMERCIAL

## 2024-02-16 NOTE — TELEPHONE ENCOUNTER
Please see ReefEdgehart message and advise.     Lab was cancelled. Does provider want patient to still have the test done?         Angle GRAF RN  [facility] Triage Team

## 2024-02-18 NOTE — TELEPHONE ENCOUNTER
It would have been . OK to discard it.     Please schedule Annual physical with me due in 2024 to re-evaluate current condition and further recommendations.  Please assist in scheduling.     Thank you  Corine Tomas MD on 2024

## 2024-04-20 SDOH — HEALTH STABILITY: PHYSICAL HEALTH: ON AVERAGE, HOW MANY DAYS PER WEEK DO YOU ENGAGE IN MODERATE TO STRENUOUS EXERCISE (LIKE A BRISK WALK)?: 0 DAYS

## 2024-04-20 SDOH — HEALTH STABILITY: PHYSICAL HEALTH: ON AVERAGE, HOW MANY MINUTES DO YOU ENGAGE IN EXERCISE AT THIS LEVEL?: 0 MIN

## 2024-04-20 ASSESSMENT — SOCIAL DETERMINANTS OF HEALTH (SDOH): HOW OFTEN DO YOU GET TOGETHER WITH FRIENDS OR RELATIVES?: ONCE A WEEK

## 2024-04-20 NOTE — COMMUNITY RESOURCES LIST (ENGLISH)
April 20, 2024           YOUR PERSONALIZED LIST OF SERVICES & PROGRAMS           & RECREATION    Sports      Park & Recreation [facility] - Walking Group  [address] [place] [address] (Distance: 0.6 miles)  Phone: [phone]  Language: English  Fee: Free  Accessibility: Ada accessible, Translation services      Park & Recreation Board - Sports clubs and recreational activities - [place] [facility] - [facility]  [address] [place] [address] (Distance: 1.4 miles)  Phone: [phone]  Language: English, Chinese, Colombian  Fee: Free, Self pay  Accessibility: Ada accessible      [facility] - Adult Enrichment  Phone: [phone]  Website: [email or web address]  Language: English  Hours: Mon 7:30 AM - 4:00 PM Tue 7:30 AM - 4:00 PM Wed 7:30 AM - 4:00 PM Thu 7:30 AM - 4:00 PM Fri 7:30 AM - 4:00 PM    Classes/Groups      Park & Recreation Board - Gym or workout facility - [place] [facility] - [facility]  [address] [place] [address] (Distance: 2.3 miles)  Phone: [phone]  Language: English, Chinese  Fee: Free, Self pay  Accessibility: Translation services      Park & Recreation [facility] - Gym or workout facility - [facility] & Recreation [facility]  [address] [place] [address] (Distance: 0.6 miles)  Language: English  Fee: Free, Self pay  Accessibility: Ada accessible      [facility] - Adult Enrichment  Phone: [phone]  Website: [email or web address]  Language: English  Hours: Mon 7:30 AM - 4:00 PM Tue 7:30 AM - 4:00 PM Wed 7:30 AM - 4:00 PM Thu 7:30 AM - 4:00 PM Fri 7:30 AM - 4:00 PM               IMPORTANT NUMBERS & WEBSITES        Emergency Services  911  .   [facility]  211 [email or web address]  .   Poison Control  (800)  496-5881 http://[place]poison.org http://wis[place]poison.org  .     Suicide and Crisis Lifeline  988 http://988lifeline.org  .   Childhelp [place] Child Abuse Hotline  448.478.4408 http://Childhelphotline.org   .   [place] Sexual Assault Hotline  (279) 777-8988 (HOPE) http://Rainn.org   .     National Runaway Safeline  (335) 797-6756 (RUNAWAY) http://[employer].In[employer] In Context  .   Pregnancy & Postpartum Support  Call/text 778-589-0445  [place]: http://ppsupportmn.org  [place]: http://Job1001.com/wi  .   Substance Abuse National Helpline ([facility])  694-950-HELP (8020) http://Findtreatment.gov   .                DISCLAIMER: These resources have been generated via the [employer]. [employer] does not endorse any service providers mentioned in this resource list. [employer] does not guarantee that the services mentioned in this resource list will be available to you or will improve your health or wellness.    [facility]

## 2024-04-23 ENCOUNTER — OFFICE VISIT (OUTPATIENT)
Dept: FAMILY MEDICINE | Facility: CLINIC | Age: 39
End: 2024-04-23
Payer: COMMERCIAL

## 2024-04-23 VITALS
HEART RATE: 68 BPM | TEMPERATURE: 97.4 F | BODY MASS INDEX: 20.76 KG/M2 | RESPIRATION RATE: 15 BRPM | HEIGHT: 70 IN | OXYGEN SATURATION: 99 % | WEIGHT: 145 LBS | DIASTOLIC BLOOD PRESSURE: 76 MMHG | SYSTOLIC BLOOD PRESSURE: 118 MMHG

## 2024-04-23 DIAGNOSIS — M72.2 PLANTAR FASCIITIS OF RIGHT FOOT: ICD-10-CM

## 2024-04-23 DIAGNOSIS — Z13.6 CARDIOVASCULAR SCREENING; LDL GOAL LESS THAN 160: ICD-10-CM

## 2024-04-23 DIAGNOSIS — Z00.00 ROUTINE GENERAL MEDICAL EXAMINATION AT A HEALTH CARE FACILITY: Primary | ICD-10-CM

## 2024-04-23 DIAGNOSIS — K21.9 GASTROESOPHAGEAL REFLUX DISEASE, UNSPECIFIED WHETHER ESOPHAGITIS PRESENT: ICD-10-CM

## 2024-04-23 DIAGNOSIS — K29.20 ALCOHOLIC GASTRITIS WITHOUT BLEEDING, UNSPECIFIED CHRONICITY: ICD-10-CM

## 2024-04-23 DIAGNOSIS — R10.10 UPPER ABDOMINAL PAIN: ICD-10-CM

## 2024-04-23 LAB
ERYTHROCYTE [DISTWIDTH] IN BLOOD BY AUTOMATED COUNT: 13.3 % (ref 10–15)
HCT VFR BLD AUTO: 41.5 % (ref 40–53)
HGB BLD-MCNC: 14.4 G/DL (ref 13.3–17.7)
MCH RBC QN AUTO: 30.2 PG (ref 26.5–33)
MCHC RBC AUTO-ENTMCNC: 34.7 G/DL (ref 31.5–36.5)
MCV RBC AUTO: 87 FL (ref 78–100)
PLATELET # BLD AUTO: 251 10E3/UL (ref 150–450)
RBC # BLD AUTO: 4.77 10E6/UL (ref 4.4–5.9)
WBC # BLD AUTO: 4.5 10E3/UL (ref 4–11)

## 2024-04-23 PROCEDURE — 99396 PREV VISIT EST AGE 40-64: CPT | Performed by: INTERNAL MEDICINE

## 2024-04-23 PROCEDURE — 99214 OFFICE O/P EST MOD 30 MIN: CPT | Mod: 25 | Performed by: INTERNAL MEDICINE

## 2024-04-23 PROCEDURE — 80053 COMPREHEN METABOLIC PANEL: CPT | Performed by: INTERNAL MEDICINE

## 2024-04-23 PROCEDURE — 85027 COMPLETE CBC AUTOMATED: CPT | Performed by: INTERNAL MEDICINE

## 2024-04-23 PROCEDURE — 80061 LIPID PANEL: CPT | Performed by: INTERNAL MEDICINE

## 2024-04-23 PROCEDURE — 36415 COLL VENOUS BLD VENIPUNCTURE: CPT | Performed by: INTERNAL MEDICINE

## 2024-04-23 ASSESSMENT — PAIN SCALES - GENERAL: PAINLEVEL: NO PAIN (0)

## 2024-04-23 NOTE — PATIENT INSTRUCTIONS
As discussed, please do fasting labs placed.     Will consider H.Pylori test before you start off on medication for your GERD . Again Alcohol is triggering factor for this. Please quit alcohol completely.       =======================================  PLANTAR FASCIITIS RELIEF       What are the symptoms of plantar fasciitis? -- The most common symptom is pain under the heel and sole (bottom) of the foot. The pain is often worst when you first get out of bed in the morning. It can also be bad when you get up after being seated for some time.  Is there anything I can do on my own to feel better? -- Yes, you can:  ?Rest - Give your foot a chance to heal by resting. But don't completely stop being active. Doing that can lead to more pain and stiffness in the long run.  ?Ice your foot - Putting ice on your heel for 20 minutes up to 4 times a day might relieve pain. Icing and massaging your foot before exercise might also help.  ?Do special foot exercises - Certain exercises can help with heel pain. Do these exercises every day (figure 2).  ?Take pain medicines - If your pain is severe, you can try taking pain medicines that you can get without a prescription. Examples includeTylenol  But if you have other medical conditions or already take other medicines, ask your doctor or nurse before taking new pain medicines.  ?Wear sturdy shoes - Sneakers with a lot of cushion and good arch and heel support are best. Shoes with rigid soles can also help. Adding padded or gel heel inserts to your shoes might help, too.  ?Wear splints at night - Some people feel better if they wear a splint while they sleep that keeps their foot straight. These splints are sold in drugstores and medical supply stores.  Is there a test for plantar fasciitis? -- No, there is no test. But your doctor or nurse should be able to tell if you have it by learning about your symptoms and doing an exam. He or she might suggest an X-ray, or other tests to check  "whether your symptoms might be caused by something else.  How is plantar fasciitis treated? -- The first step is to try the things you can do on your own. But if you do not get better, or your symptoms are severe, your doctor or nurse might suggest:  ?Taping up your foot in a special way that helps the support the foot (picture 1)  ?Special shoe inserts, made to fit your foot  ?Shots (that go into your foot) of a medicine called a steroid, which can help with the pain  ?Putting a splint over your foot and ankle  ?Surgery (this is an option only in some cases that do not get better with other treatments)  Some doctors also suggest a treatment called \"shock wave therapy.\" This treatment is painful and has not been proven to work.  Is there anything I can do to keep from getting heel pain again? -- Yes. To reduce the chances that your pain will come back:  ?Wear shoes that fit well, have a lot of cushion, and support the heel and ankle  ?Avoid wearing slippers, flip-flops, slip-ons, or poorly fitted shoes  ?Avoid going barefoot  ?Do not wear worn-out shoes    Foot taping for plantar fasciitis    This way of taping the foot sometimes helps with plantar fasciitis. You can use sports tape, available at drug stores, for this. Here are the steps involved, from left to right.  (1) Wrap a strip of tape around the foot, at the level of the ball of the foot.  (2) Wrap a second strip of tape around the heel, starting just below the pinky toe, around the sides of the heel, and back up to the first strip of tape.  (3) Wrap a third strip of tape around the heel, starting just below the pinky toe, like you did in step 2. This time, [place] the heel and wrap the tape in a rhonda-cross, so that it ends just below the big toe.  (4) Repeat step 3. The tape does not need to align perfectly. The tape can stay in place for 1 week.      ============================    Exercises that can help with heel " pain                ============================================  Preventive Care Advice   This is general advice given by our system to help you stay healthy. However, your care team may have specific advice just for you. Please talk to your care team about your preventive care needs.  Nutrition  Eat 5 or more servings of fruits and vegetables each day.  Try wheat bread, brown rice and whole grain pasta (instead of white bread, rice, and pasta).  Get enough calcium and vitamin D. Check the label on foods and aim for 100% of the RDA (recommended daily allowance).  Lifestyle  Exercise at least 150 minutes each week   (30 minutes a day, 5 days a week).  Do muscle strengthening activities 2 days a week. These help control your weight and prevent disease.  No smoking.  Wear sunscreen to prevent skin cancer.  Have a dental exam and cleaning every 6 months.  Yearly exams  See your health care team every year to talk about:  Any changes in your health.  Any medicines your care team has prescribed.  Preventive care, family planning, and ways to prevent chronic diseases.  Shots (vaccines)   HPV shots (up to age 26), if you've never had them before.  Hepatitis B shots (up to age 59), if you've never had them before.  COVID-19 shot: Get this shot when it's due.  Flu shot: Get a flu shot every year.  Tetanus shot: Get a tetanus shot every 10 years.  Pneumococcal, hepatitis A, and RSV shots: Ask your care team if you need these based on your risk.  Shingles shot (for age 50 and up).  General health tests  Diabetes screening:  Starting at age 35, Get screened for diabetes at least every 3 years.  If you are younger than age 35, ask your care team if you should be screened for diabetes.  Cholesterol test: At age 39, start having a cholesterol test every 5 years, or more often if advised.  Bone density scan (DEXA): At age 50, ask your care team if you should have this scan for osteoporosis (brittle bones).  Hepatitis C: Get tested  at least once in your life.  STIs (sexually transmitted infections)  Before age 24: Ask your care team if you should be screened for STIs.  After age 24: Get screened for STIs if you're at risk. You are at risk for STIs (including HIV) if:  You are sexually active with more than one person.  You don't use condoms every time.  You or a partner was diagnosed with a sexually transmitted infection.  If you are at risk for HIV, ask about PrEP medicine to prevent HIV.  Get tested for HIV at least once in your life, whether you are at risk for HIV or not.  Cancer screening tests  Cervical cancer screening: If you have a cervix, begin getting regular cervical cancer screening tests at age 21. Most people who have regular screenings with normal results can stop after age 65. Talk about this with your provider.  Breast cancer scan (mammogram): If you've ever had breasts, begin having regular mammograms starting at age 40. This is a scan to check for breast cancer.  Colon cancer screening: It is important to start screening for colon cancer at age 45.  Have a colonoscopy test every 10 years (or more often if you're at risk) Or, ask your provider about stool tests like a FIT test every year or Cologuard test every 3 years.  To learn more about your testing options, visit: [email or web address]/[email or web address].  For help making a decision, visit: https://bit.ly/ee87044.  Prostate cancer screening test: If you have a prostate and are age 55 to 69, ask your provider if you would benefit from a yearly prostate cancer screening test.  Lung cancer screening: If you are a current or former smoker age 50 to 80, ask your care team if ongoing lung cancer screenings are right for you.  For informational purposes only. Not to replace the advice of your health care provider. Copyright   2023 [employer]. All rights reserved. Clinically reviewed by the [facility] Transitions Program. NXE 745992 - REV  01/24.    Learning About Stress  What is stress?     Stress is your body's response to a hard situation. Your body can have a physical, emotional, or mental response. Stress is a fact of life for most people, and it affects everyone differently. What causes stress for you may not be stressful for someone else.  A lot of things can cause stress. You may feel stress when you go on a job interview, take a test, or run a race. This kind of short-term stress is normal and even useful. It can help you if you need to work hard or react quickly. For example, stress can help you finish an important job on time.  Long-term stress is caused by ongoing stressful situations or events. Examples of long-term stress include long-term health problems, ongoing problems at work, or conflicts in your family. Long-term stress can harm your health.  How does stress affect your health?  When you are stressed, your body responds as though you are in danger. It makes hormones that speed up your heart, make you breathe faster, and give you a burst of energy. This is called the fight-or-flight stress response. If the stress is over quickly, your body goes back to normal and no harm is done.  But if stress happens too often or lasts too long, it can have bad effects. Long-term stress can make you more likely to get sick, and it can make symptoms of some diseases worse. If you tense up when you are stressed, you may develop neck, shoulder, or low back pain. Stress is linked to high blood pressure and heart disease.  Stress also harms your emotional health. It can make you gibbons, tense, or depressed. Your relationships may suffer, and you may not do well at work or school.  What can you do to manage stress?  You can try these things to help manage stress:   Do something active. Exercise or activity can help reduce stress. Walking is a great way to get started. Even everyday activities such as housecleaning or yard work can help.  Try yoga or roberta  chi. These techniques combine exercise and meditation. You may need some training at first to learn them.  Do something you enjoy. For example, listen to music or go to a movie. Practice your hobby or do volunteer work.  Meditate. This can help you relax, because you are not worrying about what happened before or what may happen in the future.  Do guided imagery. Imagine yourself in any setting that helps you feel calm. You can use online videos, books, or a teacher to guide you.  Do breathing exercises. For example:  From a standing position, bend forward from the waist with your knees slightly bent. Let your arms dangle close to the floor.  Breathe in slowly and deeply as you return to a standing position. Roll up slowly and lift your head last.  Hold your breath for just a few seconds in the standing position.  Breathe out slowly and bend forward from the waist.  Let your feelings out. Talk, laugh, cry, and express anger when you need to. Talking with supportive friends or family, a counselor, or a dmitry leader about your feelings is a healthy way to relieve stress. Avoid discussing your feelings with people who make you feel worse.  Write. It may help to write about things that are bothering you. This helps you find out how much stress you feel and what is causing it. When you know this, you can find better ways to cope.  What can you do to prevent stress?  You might try some of these things to help prevent stress:  Manage your time. This helps you find time to do the things you want and need to do.  Get enough sleep. Your body recovers from the stresses of the day while you are sleeping.  Get support. Your family, friends, and community can make a difference in how you experience stress.  Limit your news feed. Avoid or limit time on social media or news that may make you feel stressed.  Do something active. Exercise or activity can help reduce stress. Walking is a great way to get started.  Where can you learn  "more?  Go to https://www.Actix.net/patiented  Enter N032 in the search box to learn more about \"Learning About Stress.\"  Current as of: October 24, 2023               Content Version: 14.0    2122-2206 [employer].   Care instructions adapted under license by your healthcare professional. If you have questions about a medical condition or this instruction, always ask your healthcare professional. [employer] disclaims any warranty or liability for your use of this information.      Substance Use Disorder: Care Instructions  Overview     You can improve your life and health by stopping your use of alcohol or drugs. When you don't drink or use drugs, you may feel and sleep better. You may get along better with your family, friends, and coworkers. There are medicines and programs that can help with substance use disorder.  How can you care for yourself at home?  Here are some ways to help you stay sober and prevent relapse.  If you have been given medicine to help keep you sober or reduce your cravings, be sure to take it exactly as prescribed.  Talk to your doctor about programs that can help you stop using drugs or drinking alcohol.  Do not keep alcohol or drugs in your home.  Plan ahead. Think about what you'll say if other people ask you to drink or use drugs. Try not to spend time with people who drink or use drugs.  Use the time and money spent on drinking or drugs to do something that's important to you.  Preventing a relapse  Have a plan to deal with relapse. Learn to recognize changes in your thinking that lead you to drink or use drugs. Get help before you start to drink or use drugs again.  Try to stay away from situations, friends, or places that may lead you to drink or use drugs.  If you feel the need to drink alcohol or use drugs again, seek help right away. Call a trusted friend or family member. Some people get support from organizations such as Narcotics Anonymous or " SMART Recovery or from treatment facilities.  If you relapse, get help as soon as you can. Some people make a plan with another person that outlines what they want that person to do for them if they relapse. The plan usually includes how to handle the relapse and who to notify in case of relapse.  Don't give up. Remember that a relapse doesn't mean that you have failed. Use the experience to learn the triggers that lead you to drink or use drugs. Then quit again. Recovery is a lifelong process. Many people have several relapses before they are able to quit for good.  Follow-up care is a key part of your treatment and safety. Be sure to make and go to all appointments, and call your doctor if you are having problems. It's also a good idea to know your test results and keep a list of the medicines you take.  When should you call for help?   Call 911  anytime you think you may need emergency care. For example, call if you or someone else:    Has overdosed or has withdrawal signs. Be sure to tell the emergency workers that you are or someone else is using or trying to quit using drugs. Overdose or withdrawal signs may include:  Losing consciousness.  Seizure.  Seeing or hearing things that aren't there (hallucinations).     Is thinking or talking about suicide or harming others.   Where to get help 24 hours a day, 7 days a week   If you or someone you know talks about suicide, self-harm, a mental health crisis, a substance use crisis, or any other kind of emotional distress, get help right away. You can:    Call the Suicide and Crisis Lifeline at 988.     Call 2-646-367-MKEZ (1-445.731.3471).     Text HOME to 137940 to access the Crisis Text Line.   Consider saving these numbers in your phone.  Go to [employer] for more information or to chat online.  Call your doctor now or seek immediate medical care if:    You are having withdrawal symptoms. These may include nausea or vomiting, sweating, shakiness, and anxiety.  "  Watch closely for changes in your health, and be sure to contact your doctor if:    You have a relapse.     You need more help or support to stop.   Where can you learn more?  Go to https://www.ChallengePost.net/patiented  Enter H573 in the search box to learn more about \"Substance Use Disorder: Care Instructions.\"  Current as of: November 15, 2023               Content Version: 14.0    6283-4243 [employer].   Care instructions adapted under license by your healthcare professional. If you have questions about a medical condition or this instruction, always ask your healthcare professional. [employer] disclaims any warranty or liability for your use of this information.      "

## 2024-04-23 NOTE — PROGRESS NOTES
Preventive Care Visit  [facility] MARY Tomas MD, Internal Medicine  Apr 23, 2024        Assessment and Plan  1. Routine general medical examination at a health care facility    Last seen pt on 4/2023 for annual physical at that time, He is here for the same. He did have concerns of unintentional weight loss at that time and GERD due to alcoholic gastritis as well as RUQ pain. H.Pylori test ordered last time which I dont see was done by the pt. .     Currently on 1-2 episodes of alcohol / month and 2 beers each time.     - Lipid panel reflex to direct LDL Fasting; Future  - Comprehensive metabolic panel (BMP + Alb, Alk Phos, ALT, AST, Total. Bili, TP); Future  - CBC with platelets; Future  - Lipid panel reflex to direct LDL Fasting  - Comprehensive metabolic panel (BMP + Alb, Alk Phos, ALT, AST, Total. Bili, TP)  - CBC with platelets    2. Gastroesophageal reflux disease, unspecified whether esophagitis present  3. Upper abdominal pain  4. Alcoholic gastritis without bleeding, unspecified chronicity  Ongoing problem, Uncontrolled with intermittent flareups. Encouraged to quit alcohol completely which he endorses as well as the triggering factor. Will consider re ordering H.pylori which pt has not done on the orders placed last visit.   - Will restart on Omeprazole for improvement.   - Comprehensive metabolic panel (BMP + Alb, Alk Phos, ALT, AST, Total. Bili, TP); Future  - CBC with platelets; Future  - Helicobacter pylori Antigen Stool; Future  - omeprazole (PRILOSEC) 20 MG DR capsule; Take 1 capsule (20 mg) by mouth daily  Dispense: 30 capsule; Refill: 1  - Comprehensive metabolic panel (BMP + Alb, Alk Phos, ALT, AST, Total. Bili, TP)  - CBC with platelets  - Helicobacter pylori Antigen Stool    5. CARDIOVASCULAR SCREENING; LDL GOAL LESS THAN 160  - Lipid panel reflex to direct LDL Fasting; Future  - Lipid panel reflex to direct LDL Fasting    6. Plantar fasciitis of right  foot  Chronic problem, pt has seen podiatry for taking care of his Rt foot pain which was diagnosed as Plantar fasciitis. Physical exam negative for any tenderness on calcaneus or tendo-achilles tenderness on palpation.Will consider conservative management as mentioned in AVS below . No acute needs of X ray at this time as per shared decision. Pt understood and agreed with the plan.           Please note that this note consists of symbols derived from keyboarding, dictation and/or voice recognition software. As a result, there may be errors in the script that have gone undetected. Please consider this when interpreting information found in this chart.    Patient Instructions   As discussed, please do fasting labs placed.     Will consider H.Pylori test before you start off on medication for your GERD . Again Alcohol is triggering factor for this. Please quit alcohol completely.       =======================================  PLANTAR FASCIITIS RELIEF       What are the symptoms of plantar fasciitis? -- The most common symptom is pain under the heel and sole (bottom) of the foot. The pain is often worst when you first get out of bed in the morning. It can also be bad when you get up after being seated for some time.  Is there anything I can do on my own to feel better? -- Yes, you can:  ?Rest - Give your foot a chance to heal by resting. But don't completely stop being active. Doing that can lead to more pain and stiffness in the long run.  ?Ice your foot - Putting ice on your heel for 20 minutes up to 4 times a day might relieve pain. Icing and massaging your foot before exercise might also help.  ?Do special foot exercises - Certain exercises can help with heel pain. Do these exercises every day (figure 2).  ?Take pain medicines - If your pain is severe, you can try taking pain medicines that you can get without a prescription. Examples includeTylenol  But if you have other medical conditions or already take other  "medicines, ask your doctor or nurse before taking new pain medicines.  ?Wear sturdy shoes - Sneakers with a lot of cushion and good arch and heel support are best. Shoes with rigid soles can also help. Adding padded or gel heel inserts to your shoes might help, too.  ?Wear splints at night - Some people feel better if they wear a splint while they sleep that keeps their foot straight. These splints are sold in [employer] and medical supply stores.  Is there a test for plantar fasciitis? -- No, there is no test. But your doctor or nurse should be able to tell if you have it by learning about your symptoms and doing an exam. He or she might suggest an X-ray, or other tests to check whether your symptoms might be caused by something else.  How is plantar fasciitis treated? -- The first step is to try the things you can do on your own. But if you do not get better, or your symptoms are severe, your doctor or nurse might suggest:  ?Taping up your foot in a special way that helps the support the foot (picture 1)  ?Special shoe inserts, made to fit your foot  ?Shots (that go into your foot) of a medicine called a steroid, which can help with the pain  ?Putting a splint over your foot and ankle  ?Surgery (this is an option only in some cases that do not get better with other treatments)  Some doctors also suggest a treatment called \"shock wave therapy.\" This treatment is painful and has not been proven to work.  Is there anything I can do to keep from getting heel pain again? -- Yes. To reduce the chances that your pain will come back:  ?Wear shoes that fit well, have a lot of cushion, and support the heel and ankle  ?Avoid wearing slippers, flip-flops, slip-ons, or poorly fitted shoes  ?Avoid going barefoot  ?Do not wear worn-out shoes    Foot taping for plantar fasciitis    This way of taping the foot sometimes helps with plantar fasciitis. You can use sports tape, available at drug stores, for this. Here are the steps " involved, from left to right.  (1) Wrap a strip of tape around the foot, at the level of the ball of the foot.  (2) Wrap a second strip of tape around the heel, starting just below the pinky toe, around the sides of the heel, and back up to the first strip of tape.  (3) Wrap a third strip of tape around the heel, starting just below the pinky toe, like you did in step 2. This time, [place] the heel and wrap the tape in a rhonda-cross, so that it ends just below the big toe.  (4) Repeat step 3. The tape does not need to align perfectly. The tape can stay in place for 1 week.      ============================    Exercises that can help with heel pain                ============================================  Preventive Care Advice   This is general advice given by our system to help you stay healthy. However, your care team may have specific advice just for you. Please talk to your care team about your preventive care needs.  Nutrition  Eat 5 or more servings of fruits and vegetables each day.  Try wheat bread, brown rice and whole grain pasta (instead of white bread, rice, and pasta).  Get enough calcium and vitamin D. Check the label on foods and aim for 100% of the RDA (recommended daily allowance).  Lifestyle  Exercise at least 150 minutes each week   (30 minutes a day, 5 days a week).  Do muscle strengthening activities 2 days a week. These help control your weight and prevent disease.  No smoking.  Wear sunscreen to prevent skin cancer.  Have a dental exam and cleaning every 6 months.  Yearly exams  See your health care team every year to talk about:  Any changes in your health.  Any medicines your care team has prescribed.  Preventive care, family planning, and ways to prevent chronic diseases.  Shots (vaccines)   HPV shots (up to age 26), if you've never had them before.  Hepatitis B shots (up to age 59), if you've never had them before.  COVID-19 shot: Get this shot when it's due.  Flu shot: Get a flu shot  every year.  Tetanus shot: Get a tetanus shot every 10 years.  Pneumococcal, hepatitis A, and RSV shots: Ask your care team if you need these based on your risk.  Shingles shot (for age 50 and up).  General health tests  Diabetes screening:  Starting at age 35, Get screened for diabetes at least every 3 years.  If you are younger than age 35, ask your care team if you should be screened for diabetes.  Cholesterol test: At age 39, start having a cholesterol test every 5 years, or more often if advised.  Bone density scan (DEXA): At age 50, ask your care team if you should have this scan for osteoporosis (brittle bones).  Hepatitis C: Get tested at least once in your life.  STIs (sexually transmitted infections)  Before age 24: Ask your care team if you should be screened for STIs.  After age 24: Get screened for STIs if you're at risk. You are at risk for STIs (including HIV) if:  You are sexually active with more than one person.  You don't use condoms every time.  You or a partner was diagnosed with a sexually transmitted infection.  If you are at risk for HIV, ask about PrEP medicine to prevent HIV.  Get tested for HIV at least once in your life, whether you are at risk for HIV or not.  Cancer screening tests  Cervical cancer screening: If you have a cervix, begin getting regular cervical cancer screening tests at age 21. Most people who have regular screenings with normal results can stop after age 65. Talk about this with your provider.  Breast cancer scan (mammogram): If you've ever had breasts, begin having regular mammograms starting at age 40. This is a scan to check for breast cancer.  Colon cancer screening: It is important to start screening for colon cancer at age 45.  Have a colonoscopy test every 10 years (or more often if you're at risk) Or, ask your provider about stool tests like a FIT test every year or Cologuard test every 3 years.  To learn more about your testing options, visit:  https://[email or web address]/456429.pdf.  For help making a decision, visit: https://bit.ly/yz13413.  Prostate cancer screening test: If you have a prostate and are age 55 to 69, ask your provider if you would benefit from a yearly prostate cancer screening test.  Lung cancer screening: If you are a current or former smoker age 50 to 80, ask your care team if ongoing lung cancer screenings are right for you.  For informational purposes only. Not to replace the advice of your health care provider. Copyright   2023 [employer]. All rights reserved. Clinically reviewed by the [facility]. ACTON 126487 - REV 01/24.    Learning About Stress  What is stress?     Stress is your body's response to a hard situation. Your body can have a physical, emotional, or mental response. Stress is a fact of life for most people, and it affects everyone differently. What causes stress for you may not be stressful for someone else.  A lot of things can cause stress. You may feel stress when you go on a job interview, take a test, or run a race. This kind of short-term stress is normal and even useful. It can help you if you need to work hard or react quickly. For example, stress can help you finish an important job on time.  Long-term stress is caused by ongoing stressful situations or events. Examples of long-term stress include long-term health problems, ongoing problems at work, or conflicts in your family. Long-term stress can harm your health.  How does stress affect your health?  When you are stressed, your body responds as though you are in danger. It makes hormones that speed up your heart, make you breathe faster, and give you a burst of energy. This is called the fight-or-flight stress response. If the stress is over quickly, your body goes back to normal and no harm is done.  But if stress happens too often or lasts too long, it can have bad effects. Long-term stress can make you more  likely to get sick, and it can make symptoms of some diseases worse. If you tense up when you are stressed, you may develop neck, shoulder, or low back pain. Stress is linked to high blood pressure and heart disease.  Stress also harms your emotional health. It can make you gibbons, tense, or depressed. Your relationships may suffer, and you may not do well at work or school.  What can you do to manage stress?  You can try these things to help manage stress:   Do something active. Exercise or activity can help reduce stress. Walking is a great way to get started. Even everyday activities such as housecleaning or yard work can help.  Try yoga or roberta chi. These techniques combine exercise and meditation. You may need some training at first to learn them.  Do something you enjoy. For example, listen to music or go to a movie. Practice your hobby or do volunteer work.  Meditate. This can help you relax, because you are not worrying about what happened before or what may happen in the future.  Do guided imagery. Imagine yourself in any setting that helps you feel calm. You can use online videos, books, or a teacher to guide you.  Do breathing exercises. For example:  From a standing position, bend forward from the waist with your knees slightly bent. Let your arms dangle close to the floor.  Breathe in slowly and deeply as you return to a standing position. Roll up slowly and lift your head last.  Hold your breath for just a few seconds in the standing position.  Breathe out slowly and bend forward from the waist.  Let your feelings out. Talk, laugh, cry, and express anger when you need to. Talking with supportive friends or family, a counselor, or a dmitry leader about your feelings is a healthy way to relieve stress. Avoid discussing your feelings with people who make you feel worse.  Write. It may help to write about things that are bothering you. This helps you find out how much stress you feel and what is causing it.  "When you know this, you can find better ways to cope.  What can you do to prevent stress?  You might try some of these things to help prevent stress:  Manage your time. This helps you find time to do the things you want and need to do.  Get enough sleep. Your body recovers from the stresses of the day while you are sleeping.  Get support. Your family, friends, and community can make a difference in how you experience stress.  Limit your news feed. Avoid or limit time on social media or news that may make you feel stressed.  Do something active. Exercise or activity can help reduce stress. Walking is a great way to get started.  Where can you learn more?  Go to https://www.Enterra Feed.net/patiented  Enter N032 in the search box to learn more about \"Learning About Stress.\"  Current as of: October 24, 2023               Content Version: 14.0    6573-7754 [employer].   Care instructions adapted under license by your healthcare professional. If you have questions about a medical condition or this instruction, always ask your healthcare professional. [employer] disclaims any warranty or liability for your use of this information.      Substance Use Disorder: Care Instructions  Overview     You can improve your life and health by stopping your use of alcohol or drugs. When you don't drink or use drugs, you may feel and sleep better. You may get along better with your family, friends, and coworkers. There are medicines and programs that can help with substance use disorder.  How can you care for yourself at home?  Here are some ways to help you stay sober and prevent relapse.  If you have been given medicine to help keep you sober or reduce your cravings, be sure to take it exactly as prescribed.  Talk to your doctor about programs that can help you stop using drugs or drinking alcohol.  Do not keep alcohol or drugs in your home.  Plan ahead. Think about what you'll say if other people ask you to " drink or use drugs. Try not to spend time with people who drink or use drugs.  Use the time and money spent on drinking or drugs to do something that's important to you.  Preventing a relapse  Have a plan to deal with relapse. Learn to recognize changes in your thinking that lead you to drink or use drugs. Get help before you start to drink or use drugs again.  Try to stay away from situations, friends, or places that may lead you to drink or use drugs.  If you feel the need to drink alcohol or use drugs again, seek help right away. Call a trusted friend or family member. Some people get support from organizations such as Narcotics Anonymous or [employer] or from treatment facilities.  If you relapse, get help as soon as you can. Some people make a plan with another person that outlines what they want that person to do for them if they relapse. The plan usually includes how to handle the relapse and who to notify in case of relapse.  Don't give up. Remember that a relapse doesn't mean that you have failed. Use the experience to learn the triggers that lead you to drink or use drugs. Then quit again. Recovery is a lifelong process. Many people have several relapses before they are able to quit for good.  Follow-up care is a key part of your treatment and safety. Be sure to make and go to all appointments, and call your doctor if you are having problems. It's also a good idea to know your test results and keep a list of the medicines you take.  When should you call for help?   Call 911  anytime you think you may need emergency care. For example, call if you or someone else:    Has overdosed or has withdrawal signs. Be sure to tell the emergency workers that you are or someone else is using or trying to quit using drugs. Overdose or withdrawal signs may include:  Losing consciousness.  Seizure.  Seeing or hearing things that aren't there (hallucinations).     Is thinking or talking about suicide or harming others.  "  Where to get help 24 hours a day, 7 days a week   If you or someone you know talks about suicide, self-harm, a mental health crisis, a substance use crisis, or any other kind of emotional distress, get help right away. You can:    Call the Suicide and Crisis Lifeline at 728.     Call 2-052-153-TALK (1-246.956.6597).     Text HOME to 463758 to access the Crisis Text Line.   Consider saving these numbers in your phone.  Go to [employer] for more information or to chat online.  Call your doctor now or seek immediate medical care if:    You are having withdrawal symptoms. These may include nausea or vomiting, sweating, shakiness, and anxiety.   Watch closely for changes in your health, and be sure to contact your doctor if:    You have a relapse.     You need more help or support to stop.   Where can you learn more?  Go to https://www.Ooploo.Mibuzz.tv/patiented  Enter H573 in the search box to learn more about \"Substance Use Disorder: Care Instructions.\"  Current as of: November 15, 2023               Content Version: 14.0    6567-7852 [employer].   Care instructions adapted under license by your healthcare professional. If you have questions about a medical condition or this instruction, always ask your healthcare professional. [employer] disclaims any warranty or liability for your use of this information.      Return in about 6 months (around 10/23/2024), or if symptoms worsen or fail to improve, for If symptoms persist, Follow up of last visit.    Corine Tomas MD  [facility] MARY Braden is a 38 year old, presenting for the following:  Physical        4/23/2024    10:52 AM   Additional Questions   Roomed by [facility]        Health Care Directive  Patient does not have a Health Care Directive or Living Will:  N/A     HPI        4/20/2024   General Health   How would you rate your overall physical health? Good   Feel stress (tense, anxious, or " unable to sleep) Only a little   (!) STRESS CONCERN      4/20/2024   Nutrition   Three or more servings of calcium each day? Yes   Diet: Regular (no restrictions)   How many servings of fruit and vegetables per day? 4 or more   How many sweetened beverages each day? 0-1         4/20/2024   Exercise   Days per week of moderate/strenous exercise 0 days   Average minutes spent exercising at this level 0 min   (!) EXERCISE CONCERN      4/20/2024   Social Factors   Frequency of gathering with friends or relatives Once a week   Worry food won't last until get money to buy more No   Food not last or not have enough money for food? No   Do you have housing?  Yes   Are you worried about losing your housing? No   Lack of transportation? No   Unable to get utilities (heat,electricity)? No         4/20/2024   Dental   Dentist two times every year? Yes         4/20/2024   TB Screening   Were you born outside of the [place]? No         Today's PHQ-2 Score:       4/23/2024    10:40 AM   PHQ-2 ( 1999 [employer])   Q1: Little interest or pleasure in doing things 0   Q2: Feeling down, depressed or hopeless 0   PHQ-2 Score 0   Q1: Little interest or pleasure in doing things Not at all   Q2: Feeling down, depressed or hopeless Not at all   PHQ-2 Score 0           4/20/2024   Substance Use   Alcohol more than 3/day or more than 7/wk No   Do you use any other substances recreationally? (!) CANNABIS PRODUCTS     Social History     Tobacco Use    Smoking status: Never    Smokeless tobacco: Former     Types: Chew   Substance Use Topics    Alcohol use: Yes     Comment: Pt states that he consumes 3 drinks on Friday & Saturday    Drug use: No           4/20/2024   STI Screening   New sexual partner(s) since last STI/HIV test? No   ASCVD Risk   The 10-year ASCVD risk score (Joaquina DANIEL, et al., 2019) is: 0.4%    Values used to calculate the score:      Age: 38 years      Sex: Male      Is Non- : No      Diabetic: No       Tobacco smoker: No      Systolic Blood Pressure: 118 mmHg      Is BP treated: No      HDL Cholesterol: 64 mg/dL      Total Cholesterol: 160 mg/dL        4/20/2024   Contraception/Family Planning   Questions about contraception or family planning No        Reviewed and updated as needed this visit by Provider   Tobacco  Allergies  Meds  Problems  Med Hx  Surg Hx  Fam Hx            Past Medical History:   Diagnosis Date    NO ACTIVE PROBLEMS      Past Surgical History:   Procedure Laterality Date    none       Lab work is in process  Labs reviewed in EPIC  BP Readings from Last 3 Encounters:   04/23/24 118/76   04/11/23 118/80   01/06/22 (!) 149/81    Wt Readings from Last 3 Encounters:   04/23/24 65.8 kg (145 lb)   04/11/23 66.1 kg (145 lb 12.8 oz)   09/09/21 68 kg (150 lb)                  Patient Active Problem List   Diagnosis    CARDIOVASCULAR SCREENING; LDL GOAL LESS THAN 160     Past Surgical History:   Procedure Laterality Date    none         Social History     Tobacco Use    Smoking status: Never    Smokeless tobacco: Former     Types: Chew   Substance Use Topics    Alcohol use: Yes     Comment: Pt states that he consumes 3 drinks on Friday & Saturday     Family History   Problem Relation Age of Onset    GI problems Mother     Breast Cancer Mother     Diabetes Father     Obesity Father     Mental Illness Sister     Cerebrovascular Disease Maternal Grandmother     Colon Polyps Maternal Grandfather          Current Outpatient Medications   Medication Sig Dispense Refill    Magnesium 400 MG TABS       multivitamin w/minerals (MULTI-VITAMIN) tablet Take 1 tablet by mouth daily      omeprazole (PRILOSEC) 20 MG DR capsule Take 1 capsule (20 mg) by mouth daily 30 capsule 1     No Known Allergies  Recent Labs   Lab Test 04/11/23  1137 08/24/21  1145 08/19/19  1538 10/16/17  1425   LDL 88  --   --  60   HDL 64  --   --  81   TRIG 39  --   --  50   ALT 17  --  20 21   CR 0.92  --  0.72 0.84   GFRESTIMATED >90   "--  >90 >90   GFRESTBLACK  --   --  >90 >90   POTASSIUM 4.8  --  4.2 3.8   TSH 1.05 1.00 1.38 1.43          Review of Systems  Constitutional, HEENT, cardiovascular, pulmonary, GI, , musculoskeletal, neuro, skin, endocrine and psych systems are negative, except as otherwise noted.     Objective    Exam  /76   Pulse 68   Temp 97.4  F (36.3  C) (Temporal)   Resp 15   Ht 1.765 m (5' 9.5\")   Wt 65.8 kg (145 lb)   SpO2 99%   BMI 21.11 kg/m     Estimated body mass index is 21.11 kg/m  as calculated from the following:    Height as of this encounter: 1.765 m (5' 9.5\").    Weight as of this encounter: 65.8 kg (145 lb).    Physical Exam  GENERAL: alert and no distress  EYES: Eyes grossly normal to inspection, PERRL and conjunctivae and sclerae normal  HENT: ear canals and TM's normal, nose and mouth without ulcers or lesions  NECK: no adenopathy, no asymmetry, masses, or scars  RESP: lungs clear to auscultation - no rales, rhonchi or wheezes  CV: regular rate and rhythm, normal S1 S2, no S3 or S4, no murmur, click or rub, no peripheral edema  ABDOMEN: soft, nontender, no hepatosplenomegaly, no masses and bowel sounds normal  MS: no gross musculoskeletal defects noted, no edema  SKIN: no suspicious lesions or rashes  NEURO: Normal strength and tone, mentation intact and speech normal  PSYCH: mentation appears normal, affect normal/bright  FOOT EXAM : Negative for any tenderness on calcaneus or tendo-achilles tenderness on palpation        Signed Electronically by: Corine Tomas MD    "

## 2024-04-24 LAB
ALBUMIN SERPL BCG-MCNC: 5 G/DL (ref 3.5–5.2)
ALP SERPL-CCNC: 59 U/L (ref 40–150)
ALT SERPL W P-5'-P-CCNC: 20 U/L (ref 0–70)
ANION GAP SERPL CALCULATED.3IONS-SCNC: 10 MMOL/L (ref 7–15)
AST SERPL W P-5'-P-CCNC: 24 U/L (ref 0–45)
BILIRUB SERPL-MCNC: 0.5 MG/DL
BUN SERPL-MCNC: 12.4 MG/DL (ref 6–20)
CALCIUM SERPL-MCNC: 9.9 MG/DL (ref 8.6–10)
CHLORIDE SERPL-SCNC: 103 MMOL/L (ref 98–107)
CHOLEST SERPL-MCNC: 157 MG/DL
CREAT SERPL-MCNC: 0.84 MG/DL (ref 0.67–1.17)
DEPRECATED HCO3 PLAS-SCNC: 27 MMOL/L (ref 22–29)
EGFRCR SERPLBLD CKD-EPI 2021: >90 ML/MIN/1.73M2
FASTING STATUS PATIENT QL REPORTED: NO
GLUCOSE SERPL-MCNC: 80 MG/DL (ref 70–99)
HDLC SERPL-MCNC: 63 MG/DL
LDLC SERPL CALC-MCNC: 86 MG/DL
NONHDLC SERPL-MCNC: 94 MG/DL
POTASSIUM SERPL-SCNC: 4.5 MMOL/L (ref 3.4–5.3)
PROT SERPL-MCNC: 7.3 G/DL (ref 6.4–8.3)
SODIUM SERPL-SCNC: 140 MMOL/L (ref 135–145)
TRIGL SERPL-MCNC: 40 MG/DL

## 2024-04-25 ENCOUNTER — APPOINTMENT (OUTPATIENT)
Dept: LAB | Facility: CLINIC | Age: 39
End: 2024-04-25
Payer: COMMERCIAL

## 2024-04-25 PROCEDURE — 87338 HPYLORI STOOL AG IA: CPT | Performed by: INTERNAL MEDICINE

## 2024-04-26 LAB — H PYLORI AG STL QL IA: NEGATIVE

## 2024-06-24 DIAGNOSIS — K21.9 GASTROESOPHAGEAL REFLUX DISEASE, UNSPECIFIED WHETHER ESOPHAGITIS PRESENT: ICD-10-CM

## 2024-06-24 NOTE — TELEPHONE ENCOUNTER
Prescription approved per [facility] Refill Protocol.  Lulu Santo RN  [facility]

## 2025-08-10 SDOH — HEALTH STABILITY: PHYSICAL HEALTH
ON AVERAGE, HOW MANY DAYS PER WEEK DO YOU ENGAGE IN MODERATE TO STRENUOUS EXERCISE (LIKE A BRISK WALK)?: PATIENT DECLINED

## 2025-08-10 SDOH — HEALTH STABILITY: PHYSICAL HEALTH: ON AVERAGE, HOW MANY MINUTES DO YOU ENGAGE IN EXERCISE AT THIS LEVEL?: PATIENT DECLINED

## 2025-08-10 ASSESSMENT — SOCIAL DETERMINANTS OF HEALTH (SDOH): HOW OFTEN DO YOU GET TOGETHER WITH FRIENDS OR RELATIVES?: ONCE A WEEK

## 2025-08-13 ENCOUNTER — OFFICE VISIT (OUTPATIENT)
Dept: FAMILY MEDICINE | Facility: CLINIC | Age: 40
End: 2025-08-13
Payer: COMMERCIAL

## 2025-08-13 VITALS
SYSTOLIC BLOOD PRESSURE: 110 MMHG | DIASTOLIC BLOOD PRESSURE: 74 MMHG | HEART RATE: 70 BPM | WEIGHT: 144.8 LBS | RESPIRATION RATE: 14 BRPM | OXYGEN SATURATION: 99 % | HEIGHT: 69 IN | BODY MASS INDEX: 21.45 KG/M2 | TEMPERATURE: 97.3 F

## 2025-08-13 DIAGNOSIS — K21.9 GASTROESOPHAGEAL REFLUX DISEASE, UNSPECIFIED WHETHER ESOPHAGITIS PRESENT: ICD-10-CM

## 2025-08-13 DIAGNOSIS — H93.13 TINNITUS, BILATERAL: ICD-10-CM

## 2025-08-13 DIAGNOSIS — Z13.220 ENCOUNTER FOR SCREENING FOR LIPID DISORDER: ICD-10-CM

## 2025-08-13 DIAGNOSIS — Z00.00 ROUTINE GENERAL MEDICAL EXAMINATION AT A HEALTH CARE FACILITY: Primary | ICD-10-CM

## 2025-08-13 LAB
ALBUMIN SERPL BCG-MCNC: 4.6 G/DL (ref 3.5–5.2)
ALP SERPL-CCNC: 55 U/L (ref 40–150)
ALT SERPL W P-5'-P-CCNC: 12 U/L (ref 0–70)
ANION GAP SERPL CALCULATED.3IONS-SCNC: 10 MMOL/L (ref 7–15)
AST SERPL W P-5'-P-CCNC: 21 U/L (ref 0–45)
BILIRUB SERPL-MCNC: 0.7 MG/DL
BUN SERPL-MCNC: 8.3 MG/DL (ref 6–20)
CALCIUM SERPL-MCNC: 9.5 MG/DL (ref 8.8–10.4)
CHLORIDE SERPL-SCNC: 104 MMOL/L (ref 98–107)
CHOLEST SERPL-MCNC: 156 MG/DL
CREAT SERPL-MCNC: 0.85 MG/DL (ref 0.67–1.17)
EGFRCR SERPLBLD CKD-EPI 2021: >90 ML/MIN/1.73M2
ERYTHROCYTE [DISTWIDTH] IN BLOOD BY AUTOMATED COUNT: 13.2 % (ref 10–15)
FASTING STATUS PATIENT QL REPORTED: YES
FASTING STATUS PATIENT QL REPORTED: YES
GLUCOSE SERPL-MCNC: 98 MG/DL (ref 70–99)
HCO3 SERPL-SCNC: 26 MMOL/L (ref 22–29)
HCT VFR BLD AUTO: 39.4 % (ref 40–53)
HDLC SERPL-MCNC: 71 MG/DL
HGB BLD-MCNC: 13.5 G/DL (ref 13.3–17.7)
LDLC SERPL CALC-MCNC: 78 MG/DL
MCH RBC QN AUTO: 29.7 PG (ref 26.5–33)
MCHC RBC AUTO-ENTMCNC: 34.3 G/DL (ref 31.5–36.5)
MCV RBC AUTO: 86.6 FL (ref 78–100)
NONHDLC SERPL-MCNC: 85 MG/DL
PLATELET # BLD AUTO: 249 10E3/UL (ref 150–450)
POTASSIUM SERPL-SCNC: 4.3 MMOL/L (ref 3.4–5.3)
PROT SERPL-MCNC: 6.9 G/DL (ref 6.4–8.3)
RBC # BLD AUTO: 4.55 10E6/UL (ref 4.4–5.9)
SODIUM SERPL-SCNC: 140 MMOL/L (ref 135–145)
TRIGL SERPL-MCNC: 34 MG/DL
WBC # BLD AUTO: 3.89 10E3/UL (ref 4–11)

## 2025-08-13 RX ORDER — OMEPRAZOLE 20 MG/1
20 CAPSULE, DELAYED RELEASE ORAL DAILY
Qty: 30 CAPSULE | Refills: 0 | Status: SHIPPED | OUTPATIENT
Start: 2025-08-13

## 2025-08-13 ASSESSMENT — PAIN SCALES - GENERAL: PAINLEVEL_OUTOF10: NO PAIN (0)
